# Patient Record
Sex: FEMALE
[De-identification: names, ages, dates, MRNs, and addresses within clinical notes are randomized per-mention and may not be internally consistent; named-entity substitution may affect disease eponyms.]

---

## 2023-03-23 ENCOUNTER — HOSPITAL ENCOUNTER (EMERGENCY)
Dept: HOSPITAL 97 - ER | Age: 38
Discharge: HOME | End: 2023-03-23
Payer: COMMERCIAL

## 2023-03-23 VITALS — SYSTOLIC BLOOD PRESSURE: 113 MMHG | DIASTOLIC BLOOD PRESSURE: 77 MMHG

## 2023-03-23 VITALS — OXYGEN SATURATION: 100 % | TEMPERATURE: 98 F

## 2023-03-23 DIAGNOSIS — Z88.5: ICD-10-CM

## 2023-03-23 DIAGNOSIS — R00.2: Primary | ICD-10-CM

## 2023-03-23 DIAGNOSIS — Z88.8: ICD-10-CM

## 2023-03-23 LAB
ALBUMIN SERPL BCP-MCNC: 3.8 G/DL (ref 3.4–5)
ALP SERPL-CCNC: 43 U/L (ref 45–117)
ALT SERPL W P-5'-P-CCNC: 33 U/L (ref 13–56)
AST SERPL W P-5'-P-CCNC: 14 U/L (ref 15–37)
BUN BLD-MCNC: 17 MG/DL (ref 7–18)
GLUCOSE SERPLBLD-MCNC: 88 MG/DL (ref 74–106)
HCT VFR BLD CALC: 33.9 % (ref 36–45)
LYMPHOCYTES # SPEC AUTO: 2.3 K/UL (ref 0.7–4.9)
MCV RBC: 94.6 FL (ref 80–100)
NT-PROBNP SERPL-MCNC: 48 PG/ML (ref ?–125)
PMV BLD: 8.6 FL (ref 7.6–11.3)
POTASSIUM SERPL-SCNC: 3.6 MEQ/L (ref 3.5–5.1)
RBC # BLD: 3.59 M/UL (ref 3.86–4.86)
TROPONIN I SERPL HS-MCNC: 3.3 PG/ML (ref ?–58.9)

## 2023-03-23 PROCEDURE — 85025 COMPLETE CBC W/AUTO DIFF WBC: CPT

## 2023-03-23 PROCEDURE — 81001 URINALYSIS AUTO W/SCOPE: CPT

## 2023-03-23 PROCEDURE — 84484 ASSAY OF TROPONIN QUANT: CPT

## 2023-03-23 PROCEDURE — 80076 HEPATIC FUNCTION PANEL: CPT

## 2023-03-23 PROCEDURE — 99284 EMERGENCY DEPT VISIT MOD MDM: CPT

## 2023-03-23 PROCEDURE — 80048 BASIC METABOLIC PNL TOTAL CA: CPT

## 2023-03-23 PROCEDURE — 71045 X-RAY EXAM CHEST 1 VIEW: CPT

## 2023-03-23 PROCEDURE — 96360 HYDRATION IV INFUSION INIT: CPT

## 2023-03-23 PROCEDURE — 36415 COLL VENOUS BLD VENIPUNCTURE: CPT

## 2023-03-23 PROCEDURE — 83880 ASSAY OF NATRIURETIC PEPTIDE: CPT

## 2023-03-23 PROCEDURE — 93005 ELECTROCARDIOGRAM TRACING: CPT

## 2023-03-23 NOTE — XMS REPORT
Continuity of Care Document

                            Created on:2023



Patient:LESLEY ESTRELLA

Sex:Female

:1985

External Reference #:[id number]





Demographics







                          Address                   [address]



                                                    [place] [address]

 

                          Home Phone                [phone]

 

                          Work Phone                [phone]

 

                          Mobile Phone              [phone]

 

                          Email Address             [email or web address]

[email or web address]

 

                          Preferred Language        en

 

                          Marital Status            Unknown

 

                          Confucianist Affiliation     Unknown

 

                          Race                      Unknown

 

                          Additional Race(s)        White



                                                    Unavailable

 

                          Ethnic Group               or 









Author







                          Organization              [facility]

t

 

                          Address                   [address]



                                                    [place] [address]

 

                          Phone                     [phone]









Support







                Name            Relationship    Address         Phone

 

                Yue Awan Mother          [address]    [phone]



                                                [place] [address] 

 

                Vasquez Parson Spouse          [address]      [phone]



                                                [place] [address] 

 

                Anshu Reza  Spouse          Unavailable     [phone]

 

                one else per patient, No Unavailable     Unavailable     Unavail

able

 

                PATIENT, NO ONE ELSE PER Unavailable     Unavailable     Unavail

able

 

                FAVIAN REZA Significant     [address] [phone]



                                                [place] [address] 

 

                Lesley Reza Unavailable     [address] [place] [address] [phone]

[address] [place] [id number] 









Care Team Providers







                    Name                Role                Phone

 

                    SCOTT LAGUNA     Primary Care Physician Unavailable

 

                    Scott Laguna     Attending Clinician Unavailable

 

                    JOE JOHN      Attending Clinician Unavailable

 

                    Joe John MD   Attending Clinician [phone]

 

                    Rachel Enrique MD     Attending Clinician [phone]

 

                    Unknown, Attending  Attending Clinician Unavailable

 

                    RACHEL ENRIQUE        Attending Clinician Unavailable

 

                    Doctor Unassigned, No Name Attending Clinician Unavailable

 

                    Only, Ang Db Test   Attending Clinician Unavailable

 

                    Joana Sorto Attending Clinician [phone]

 

                    JOANA HARRISON   Attending Clinician Unavailable

 

                    Rico Coates  Attending Clinician [phone]

 

                    RICO GEORGE      Attending Clinician Unavailable

 

                    JAN LAGUNA      Attending Clinician Unavailable

 

                    Jan Laguna MD   Attending Clinician [phone]

 

                    Only, Adc Ed Test   Attending Clinician Unavailable

 

                    Ysabel HIGGINS, Lin M      Attending Clinician Unavailable

 

                    Kieran Navarrete PA-C   Attending Clinician [phone]

 

                    KIERAN NAVARRETE        Attending Clinician Unavailable

 

                    Oneyda Catalan Attending Clinician [phone]

 

                    Provider, [facility] Attending Clinician Unavailable

 

                    Yvonne FNMauricio TURCIOS Attending Clinician [phone]

 

                    MAURICIO RUSSELL Attending Clinician Unavailable

 

                    Lab, Adc Fam Pob I  Attending Clinician Unavailable

 

                    ONEYDA DUNNE    Attending Clinician Unavailable

 

                    ULISES SCHILLING   Attending Clinician Unavailable

 

                    Amado Arreola DO Attending Clinician [phone]

 

                    Toño Gonzalez DO  Attending Clinician [phone]

 

                    Alfred FNP, Baylee  Attending Clinician [phone]

 

                    BAYLEE SIMON      Attending Clinician Unavailable

 

                    Nica Serrato Attending Clinician [phone]

 

                    Only, Dic Test      Attending Clinician Unavailable

 

                    Norris Singh MD Attending Clinician [phone]

 

                    NORRIS SINGH    Attending Clinician Unavailable

 

                    JOE JOHN      Admitting Clinician Unavailable

 

                    JAN LAGUNA      Admitting Clinician Unavailable









Payers







           Payer Name Policy Type Policy Number Effective Date Expiration Date S

ourrandall

 

           [facility]            [id number] 2018            



           EMPLOYEE PLAN                       00:00:00              

 

           [employer]         [id number] 2018            [facility]                       00:00:00              - [facility]







Problems







       Condition Condition Condition Status Onset  Resolution Last   Treating Co

mments 

Source



       Name   Details Category        Date   Date   Treatment Clinician        



                                                 Date                 

 

       Hepatic Hepatic Disease Active 2016                             Univers



       lesion lesion                                              ity of



                                   00:00:                             [address]

 

       Mesenteric Mesenteric Disease Active 2016                             U

nivers



       lymphadeno lymphadeno                                              it

y of



       nadia  nadia                00:00:                             [address]

 

       Insomnia Insomnia Disease Active                              Unive

rs



                                   9-                               ity of



                                   00:00:                             [address]

 

       Cervical Cervical Disease Active                              Unive

rs



       facet  facet                9-                               ity of



       syndrome syndrome               00:00:                             [address]

 

       [id number] Seasonal Problem Active                                    Comm

on



              allergic                                                  Spirit



              rhinitis,                                                  - [facility]



              unspecifie                                                  St



              d [facility]

 

       [id number] Hypothyroi Problem Active                                    Com

mon



              dism,                                                   Spirit



              unspecifie                                                  - [facility]



              d type                                                  [facility]

 

       [id number] ADHD   Problem Active                                    Common



              (attention                                                  Spirit



              deficit                                                  - [facility]



              hyperactiv                                                  [facility]                                                     [facility]



              disorder),                                                  Medica

l



              inattentiv                                                  Center



              e type                                                  

 

       [id number] Depression Problem Active                                    Co

mmon



              with                                                    Spirit



              anxiety                                                  - [facility]

 

       [id number] PTSD   Problem Active                                    Common



              (post-trau                                                  Spirit



              matic                                                   - [facility]



              stress                                                  St



              disorder)                                                  [facility]

 

       [id number] Hyperlipid Problem Active                                    Com

mon



              emia,                                                   Spirit



              unspecifie                                                  - [facility]



              d                                                       St



              hyperlipid                                                  [facility]



              emia [facility]

 

       [id number] Gastroesop Problem Active                                    Co

mmon



              hageal                                                  Spirit



              reflux                                                  - [facility]



              disease                                                  [facility]



              esophagiti                                                  Medica

l



              s                                                       [place]

 

       [id number] Tobacco Problem Active                                    Commo

n



              use                                                     [facility]



              disorder                                                  [facility]

 

       [id number] Primary Problem Active                                    Common



              insomnia                                                  [facility]

 

       [id number] Neck pain Problem Active                                    Comm

on



                                                                      Spirit



                                                                      [facility]

 

       [id number] Otalgia of Problem Active                                    Co

mmon



              both ears                                                  [facility]

 

       [id number] Essential Problem Active                                    Com

mon



              tremor                                                  [facility]

 

       [id number] Adult BMI Problem Active                                    Com

mon



              40.0-44.9                                                  Spirit



              kg/sq [facility]







Allergies, Adverse Reactions, Alerts







       Allergy Allergy Status Severity Reaction(s) Onset  Inactive Treating Comm

ents 

Source



       Name   Type                        Date   Date   Clinician        

 

       MORPHINE DRUG   Active        Hallucinates 2020                      Un

florian



              INGREDI                      2-11                        ity of



                                          00:00:                      [place]



                                                                    [facility]

 

       Morphine Propensi Active        Hallucinatio 2020                      

Univers



              ty to                ns     2-11                        ity of



              adverse                      00:00:                      [place]



              reaction                                                [facility]

 

       PSEUDOEP DRUG   Active        SOB                          Univers



       HEDRINE INGREDI                      9-20                        ity of



       HCL                                00::                      [place]



                                          00                          [facility]

 

       TRAMADOL DRUG   Active        Anxiety                       Univers



              INGREDI                      9-20                        ity of



                                          00:00:                      [place]



                                          00                          [facility]

 

       Pseudoep Propensi Active        Shortness of                       

Univers



       hedrine ty to                Breath 9-20                        ity of



       Hcl    adverse                      00:00:                      [place]



              reaction                                                [facility]

 

       Tramadol Propensi Active        Anxiety 2016-0                      Unive

rs



              ty to                       9-20                        ity of



              adverse                      00:00:                      [place]



              reaction                      00                          Medical



              s                                                       Branch

 

       7826   Drug   Active        makes heart                             Commo

n



              allergy               race                               [facility]

 

       topirama topirama Active        mouth break                             C

ommon



       te     te                   out                                [facility]

 

       tramadol tramadol Active        more                               Common



                                   anxious/no                             Spirit



                                   sleep                              [facility]







Social History







           Social Habit Start Date Stop Date  Quantity   Comments   Source

 

           History of Tobacco                       Current Smoker            Co

mmon Spirit -



           Use                                                    [facility]

 

           Sex Assigned At                                             Common Sp

lila -



           Birth                                                  [facility]

 

           Exposure to 2023 Not sure              [facility]



           SARS-CoV-2 (event) 00:00:00   23:13:00                         [facility]

 

           Alcohol intake 2022 0 /d                  University

 of



                      00:00:00   00:00:00                         [facility]

 

           Cigarettes smoked 2021                       Univers

ity of



           current (pack per 00:00:00   00:00:00                         [place] M

edical



           day) - Reported                                             [place]

 

           Tobacco use and 2021 Smokeless             Universit

y of



           exposure   00:00:00   00:00:00   tobacco non-user            [place] [facility]









                Smoking Status  Start Date      Stop Date       Source

 

                Current Smoker  2021 00:00:00                 [facility] Spiri

t [facility]







Medications







       Ordered Filled Start  Stop   Current Ordering Indication Dosage Frequency

 Signature

                    Comments            Components          Source



     Medication Medication Date Date Medication? Clinician                (SIG) 

          



     Name Name                                                   

 

     ketorolac      2023-              30mg      30 mg,           Unive

rs



     (TORADOL)                                Slow IV           ity of



     injection      07:00: 06:03                          Push,           [place]



     30 mg      00   :00                           ONCE, 1           Medical



                                                  dose, On           Branch



                                                  23 at           



                                                  0100,           



                                                  Routine           

 

     dextroamphe      2022      Yes            10mg      Take 10 mg           

Univers



     tamine-amph                                     by mouth           ity 

of



     etamine 10      11:08:                               every           [place]



     mg tablet      29                                 morning.           Medica

l



                                                                 Branch

 

     escitalopra      2022      Yes            20mg      Take 20 mg           

Univers



     m oxalate                                     by mouth           ity of



     20 mg      11:08:                               daily.           [place]



     tablet      29                                                Medical



                                                                 Branch

 

     propranoloL      2022      Yes            20mg      Take 20 mg           

Univers



     20 mg      1-22                               by mouth 2           ity of



     tablet      11:08:                               (two)           [place]



               29                                 times           Medical



                                                  daily.           Branch

 

     dextroamphe      2022      Yes            10mg      Take 10 mg           

Univers



     tamine-amph      1-22                               by mouth           ity 

of



     etamine 10      11:08:                               every           [place]



     mg tablet      29                                 morning.           Medica

l



                                                                 Branch

 

     escitalopra      2022      Yes            20mg      Take 20 mg           

Univers



     m oxalate      1-22                               by mouth           ity of



     20 mg      11:08:                               daily.           [place]



     tablet      29                                                Medical



                                                                 Branch

 

     propranoloL      2022      Yes            20mg      Take 20 mg           

Univers



     20 mg      1-22                               by mouth 2           ity of



     tablet      11:08:                               (two)           [place]



               29                                 times           Medical



                                                  daily.           Branch

 

     dextroamphe      2022      Yes            10mg      Take 10 mg           

Univers



     tamine-amph      1-22                               by mouth           ity 

of



     etamine 10      11:08:                               every           [place]



     mg tablet      29                                 morning.           Medica

l



                                                                 Branch

 

     escitalopra      2022      Yes            20mg      Take 20 mg           

Univers



     m oxalate      1-22                               by mouth           ity of



     20 mg      11:08:                               daily.           [place]



     tablet      29                                                Medical



                                                                 Branch

 

     propranoloL      2022      Yes            20mg      Take 20 mg           

Univers



     20 mg      1-22                               by mouth 2           ity of



     tablet      11:08:                               (two)           [place]



               29                                 times           Medical



                                                  daily.           Branch

 

     dextroamphe      2022      Yes            10mg      Take 10 mg           

Univers



     tamine-amph      1-22                               by mouth           ity 

of



     etamine 10      11:08:                               every           [place]



     mg tablet      29                                 morning.           Medica

l



                                                                 Branch

 

     escitalopra      2022      Yes            20mg      Take 20 mg           

Univers



     m oxalate      1-22                               by mouth           ity of



     20 mg      11:08:                               daily.           [place]



     tablet      29                                                Medical



                                                                 Branch

 

     propranoloL      2022      Yes            20mg      Take 20 mg           

Univers



     20 mg      1-22                               by mouth 2           ity of



     tablet      11:08:                               (two)           [place]



               29                                 times           Medical



                                                  daily.           Branch

 

     ondansetron      2022      Yes       [id number] 4mg       Take 1           U

nivers



     4 mg      1-22                               tablet by           ity of



     disintegrat      00:00:                               mouth           [place]



     ing tablet      00                                 every 8           Medica

l



                                                  (eight)           Branch



                                                  hours as           



                                                  needed for           



                                                  Nausea and           



                                                  Vomiting           



                                                  (N/V).           

 

     benzonatate      2022      Yes       [id number] 200mg      Take 2           

Univers



     100 mg      1-22                               capsules           ity of



     capsule      00:00:                               by mouth           [place]



               00                                 every 8           Medical



                                                  (eight)           Branch



                                                  hours as           



                                                  needed for           



                                                  Cough.           

 

     ondansetron      2022      Yes       [id number] 4mg       Take 1           U

nivers



     4 mg      1-22                               tablet by           ity of



     disintegrat      00:00:                               mouth           [place]



     ing tablet      00                                 every 8           Medica

l



                                                  (eight)           Branch



                                                  hours as           



                                                  needed for           



                                                  Nausea and           



                                                  Vomiting           



                                                  (N/V).           

 

     benzonatate      2022      Yes       [id number] 200mg      Take 2           

Univers



     100 mg      1-22                               capsules           ity of



     capsule      00:00:                               by mouth           [place]



               00                                 every 8           [facility]



                                                  (eight)           [facility]



                                                  hours as           



                                                  needed for           



                                                  Cough.           

 

     ondansetron      2022      Yes       [id number] 4mg       Take 1           [facility]

[facility]



     4 mg      1-22                               tablet by           [facility] of



     disintegrat      00:00:                               mouth           [place]



     ing tablet      00                                 every 8           [facility]

l



                                                  (eight)           [facility]



                                                  hours as           



                                                  needed for           



                                                  Nausea and           



                                                  Vomiting           



                                                  (N/V).           

 

     benzonatate      2022      Yes       [id number] 200mg      Take 2           

[facility]



     100 mg      1-22                               capsules           [facility]



     capsule      00:00:                               by mouth           [place]



               00                                 every 8           [facility]



                                                  (eight)           [facility]



                                                  hours as           



                                                  needed for           



                                                  Cough.           

 

     ondansetron      2022      Yes       [id number] 4mg       Take 1           [facility]

n[facility]



     4 mg      1-22                               tablet by           [facility] of



     disintegrat      00:00:                               mouth           [place]



     ing tablet      00                                 every 8           [facility]

l



                                                  (eight)           [facility]



                                                  hours as           



                                                  needed for           



                                                  Nausea and           



                                                  Vomiting           



                                                  (N/V).           

 

     benzonatate      2022      Yes       [id number] 200mg      Take 2           

[facility]



     100 mg      1-22                               capsules           [facility]



     capsule      00:00:                               by mouth           [place]



               00                                 every 8           [facility]



                                                  (eight)           [facility]



                                                  hours as           



                                                  needed for           



                                                  Cough.           

 

     oseltamivir      2022- No        [id number] 75mg      Take 1           

[facility]



     75 mg      1-22 11-28                          capsule by           [facility]



     capsule      00:00: 05:59                          mouth in           [place]



               00   :00                           the            Medical



                                                  morning           [facility]



                                                  and 1           



                                                  capsule in           



                                                  the            



                                                  evening.           



                                                  Do all           



                                                  this for 5           



                                                  days.           

 

     Amphetamine Amphetamine 2021      No             1{table BID  Amphetamin 

          



     -Dextroamph -Dextroamph 0-09                     t}        e-Dextroam      

     



     etamine 10 etamine 10 00:00:                               phetamine       

    



     MG   MG   00                                 10 MG           

 

     Adderall 10 Adderall 10 0      No             1{table BID  Adderall   

        



     MG   MG   9-08                     t}        10 MG           



               00:00:                                              



               00                                                

 

     Polymyxin Polymyxin  202- No             1{drop_ QID  Polymyxin     

      



     B-Trimethop B-Trimethop 15                into_af      B-Trimetho  

         



     rim  rim  00:00: 00:00                fected_      prim           



     98189-5.1 89458-6.1 00   :00                 eye}      07342-4.1           



     UNIT/ML UNIT/ML                                    UNIT/ML           

 

     Amphetamine Amphetamine 0      No             1{table BID  Amphetamin 

          



     -Dextroamph -Dextroamph 7-12                     t}        e-Dextroam      

     



     etamine 10 etamine 10 00:00:                               phetamine       

    



     MG   MG   00                                 10 MG           

 

     Amphetamine Amphetamine -0      No             1{table BID  Amphetamin 

          



     -Dextroamph -Dextroamph 7-12                     t}        e-Dextroam      

     



     etamine 10 etamine 10 00:00:                               phetamine       

    



     MG   MG   00                                 10 MG           

 

     Amphetamine Amphetamine -0      No             1{table BID  Amphetamin 

          



     -Dextroamph -Dextroamph 7-12                     t}        e-Dextroam      

     



     etamine 10 etamine 10 00:00:                               phetamine       

    



     MG   MG   00                                 10 MG           

 

     Amphetamine Amphetamine -0      No             1{table BID  Amphetamin 

          



     -Dextroamph -Dextroamph 7-12                     t}        e-Dextroam      

     



     etamine 10 etamine 10 00:00:                               phetamine       

    



     MG   MG   00                                 10 MG           

 

     Adderall 10 Adderall 10 -0      No             1{table BID  Adderall   

        



     MG   MG   6-08                     t}        10 MG           



               00:00:                                              



               00                                                

 

     Adderall 10 Adderall 10 -0      No             1{table BID  Adderall   

        



     MG   MG   6-08                     t}        10 MG           



               00:00:                                              



               00                                                

 

     Adderall 10 Adderall 10 -0      No             1{table BID  Adderall   

        



     MG   MG   6-08                     t}        10 MG           



               00:00:                                              



               00                                                

 

     Adderall 10 Adderall 10 -0      No             1{table BID  Adderall   

        



     MG   MG   6-08                     t}        10 MG           



               00:00:                                              



               00                                                

 

     benzonatate      -0      Yes       [id number] 100mg      Take 1          

 [facility]



     (TESSALON      4-07                               capsule by           [facility] 

of



     PERLES) 100      00:00:                               mouth 3           [facility]

as



     mg capsule      00                                 (three)           [facility]

l



                                                  times           [facility]



                                                  daily.           

 

     azelastine            Yes       [id number] 1{spray      Use 1         

  [facility]



     137 mcg      4-07                     }         Spray in           [facility]



     (0.1 %)      00:00:                               each           [place]



     nasal spray      00                                 nostril 2           [facility]

ical



                                                  (two)           [facility]



                                                  times           



                                                  daily. Use           



                                                  in each           



                                                  nostril as           



                                                  directed           

 

     benzonatate            Yes       [id number] 100mg      Take 1          

 [facility]



     (TESSALON      4-07                               capsule by           [facility] 

of



     PERLES) 100      00:00:                               mouth 3           [facility]

as



     mg capsule      00                                 (three)           [facility]

l



                                                  times           [facility]



                                                  daily.           

 

     azelastine            Yes       [id number] 1{spray      Use 1         

  [facility]



     137 mcg      4-07                     }         Spray in           [facility]



     (0.1 %)      00:00:                               each           [place]



     nasal spray      00                                 nostril 2           [facility]

ical



                                                  (two)           [facility]



                                                  times           



                                                  daily. Use           



                                                  in each           



                                                  nostril as           



                                                  directed           

 

     benzonatate            Yes       [id number] 100mg      Take 1          

 [facility]



     (TESSALON      4-07                               capsule by           [facility] 

of



     PERLES) 100      00:00:                               mouth 3           [facility]

as



     mg capsule      00                                 (three)           [facility]

l



                                                  times           [facility]



                                                  daily.           

 

     azelastine            Yes       [id number] 1{spray      Use 1         

  [facility]



     137 mcg      4-07                     }         Spray in           [facility]



     (0.1 %)      00:00:                               each           [place]



     nasal spray      00                                 nostril 2           [facility]

ical



                                                  (two)           [facility]



                                                  times           



                                                  daily. Use           



                                                  in each           



                                                  nostril as           



                                                  directed           

 

     benzonatate            Yes       [id number] 100mg      Take 1          

 [facility]



     (TESSALON      4-07                               capsule by           [facility] 

of



     PERLES) 100      00:00:                               mouth 3           [facility]

as



     mg capsule      00                                 (three)           [facility]

l



                                                  times           [facility]



                                                  daily.           

 

     azelastine            Yes       [id number] 1{spray      Use 1         

  [facility]



     137 mcg      4-07                     }         Spray in           [facility]



     (0.1 %)      00:00:                               each           [place]



     nasal spray      00                                 nostril 2           Med

ical



                                                  (two)           [facility]



                                                  times           



                                                  daily. Use           



                                                  in each           



                                                  nostril as           



                                                  directed           

 

     benzonatate            Yes       [id number] 100mg      Take 1          

 [facility]



     (TESSALON      4-07                               capsule by           [facility] 

of



     PERLES) 100      00:00:                               mouth 3           [facility]

as



     mg capsule      00                                 (three)           [facility]

l



                                                  times           [facility]



                                                  daily.           

 

     azelastine            Yes       [id number] 1{spray      Use 1         

  [facility]



     137 mcg      4-07                     }         Spray in           [facility] of



     (0.1 %)      00:00:                               each           [place]



     nasal spray      00                                 nostril 2           Med

ical



                                                  (two)           [facility]



                                                  times           



                                                  daily. Use           



                                                  in each           



                                                  nostril as           



                                                  directed           

 

     benzonatate      0      Yes       [id number] 100mg      Take 1          

 [facility]



     (TESSALON      4-07                               capsule by           [facility] 

of



     PERLES) 100      00:00:                               mouth 3           [facility]

as



     mg capsule      00                                 (three)           [facility]

l



                                                  times           [facility]



                                                  daily.           

 

     azelastine            Yes       [id number] 1{spray      Use 1         

  [facility]



     137 mcg      4-07                     }         Spray in           [facility]



     (0.1 %)      00:00:                               each           [place]



     nasal spray      00                                 nostril 2           [facility]

ical



                                                  (two)           [facility]



                                                  times           



                                                  daily. Use           



                                                  in each           



                                                  nostril as           



                                                  directed           

 

     benzonatate      0      Yes       [id number] 100mg      Take 1          

 [facility]



     (TESSALON      4-07                               capsule by           [facility] 

of



     PERLES) 100      00:00:                               mouth 3           [facility]

as



     mg capsule      00                                 (three)           [facility]

l



                                                  times           [facility]



                                                  daily.           

 

     azelastine            Yes       [id number] 1{spray      Use 1         

  [facility]



     137 mcg      4-07                     }         Spray in           [facility]



     (0.1 %)      00:00:                               each           [place]



     nasal spray      00                                 nostril 2           [facility]

ical



                                                  (two)           [facility]



                                                  times           



                                                  daily. Use           



                                                  in each           



                                                  nostril as           



                                                  directed           

 

     benzonatate      0      Yes       [id number] 100mg      Take 1          

 [facility]



     (TESSALON      4-07                               capsule by           [facility] 

of



     PERLES) 100      00:00:                               mouth 3           [facility]

as



     mg capsule      00                                 (three)           [facility]

l



                                                  times           [facility]



                                                  daily.           

 

     azelastine            Yes       [id number] 1{spray      Use 1         

  [facility]



     137 mcg      4-07                     }         Spray in           [facility] of



     (0.1 %)      00:00:                               each           [place]



     nasal spray      00                                 nostril 2           Med

ical



                                                  (two)           [facility]



                                                  times           



                                                  daily. Use           



                                                  in each           



                                                  nostril as           



                                                  directed           

 

     benzonatate      0      Yes       [id number] 100mg      Take 1          

 [facility]



     (TESSALON      4-07                               capsule by           [facility] 

of



     PERLES) 100      00:00:                               mouth 3           [facility]

as



     mg capsule      00                                 (three)           [facility]

l



                                                  times           [facility]



                                                  daily.           

 

     azelastine      0      Yes       [id number] 1{spray      Use 1         

  [facility]



     137 mcg      4-07                     }         Spray in           [facility] of



     (0.1 %)      00:00:                               each           [place]



     nasal spray      00                                 nostril 2           Med

ical



                                                  (two)           [facility]



                                                  times           



                                                  daily. Use           



                                                  in each           



                                                  nostril as           



                                                  directed           

 

     benzonatate      -0      Yes       [id number] 100mg      Take 1          

 [facility]



     (TESSALON      4-07                               capsule by           [facility] 

of



     PERLES) 100      00:00:                               mouth 3           [facility]

as



     mg capsule      00                                 (three)           [facility]

l



                                                  times           [facility]



                                                  daily.           

 

     azelastine            Yes       [id number] 1{spray      Use 1         

  [facility]



     137 mcg      4-07                     }         Spray in           [facility] of



     (0.1 %)      00:00:                               each           [place]



     nasal spray      00                                 nostril 2           Med

ical



                                                  (two)           [facility]



                                                  times           



                                                  daily. Use           



                                                  in each           



                                                  nostril as           



                                                  directed           

 

     benzonatate      0      Yes       [id number] 100mg      Take 1          

 [facility]



     (TESSALON      4-07                               capsule by           [facility] 

of



     PERLES) 100      00:00:                               mouth 3           [facility]

as



     mg capsule      00                                 (three)           [facility]

l



                                                  times           [facility]



                                                  daily.           

 

     azelastine            Yes       [id number] 1{spray      Use 1         

  [facility]



     137 mcg      4-07                     }         Spray in           [facility] of



     (0.1 %)      00:00:                               each           [place]



     nasal spray      00                                 nostril 2           [facility]

ical



                                                  (two)           [facility]



                                                  times           



                                                  daily. Use           



                                                  in each           



                                                  nostril as           



                                                  directed           

 

     benzonatate      0      Yes       [id number] 100mg      Take 1          

 [facility]



     (TESSALON      4-07                               capsule by           [facility] 

of



     PERLES) 100      00:00:                               mouth 3           [facility]

as



     mg capsule      00                                 (three)           [facility]

l



                                                  times           [facility]



                                                  daily.           

 

     azelastine            Yes       [id number] 1{spray      Use 1         

  [facility]



     137 mcg      4-07                     }         Spray in           [facility] of



     (0.1 %)      00:00:                               each           [place]



     nasal spray      00                                 nostril 2           [facility]

ical



                                                  (two)           [facility]



                                                  times           



                                                  daily. Use           



                                                  in each           



                                                  nostril as           



                                                  directed           

 

     benzonatate      -0      Yes       [id number] 100mg      Take 1          

 [facility]



     (TESSALON      4-07                               capsule by           [facility] 

of



     PERLES) 100      00:00:                               mouth 3           [facility]

as



     mg capsule      00                                 (three)           [facility]

l



                                                  times           [facility]



                                                  daily.           

 

     azelastine      0      Yes       [id number] 1{spray      Use 1         

  [facility]



     137 mcg      4-07                     }         Spray in           [facility] of



     (0.1 %)      00:00:                               each           [place]



     nasal spray      00                                 nostril 2           [facility]

ical



                                                  (two)           [facility]



                                                  times           



                                                  daily. Use           



                                                  in each           



                                                  nostril as           



                                                  directed           

 

     benzonatate      -0      Yes       [id number] 100mg      Take 1          

 [facility]



     (TESSALON      4-07                               capsule by           ity 

of



     PERLES) 100      00:00:                               mouth 3           [facility]

as



     mg capsule      00                                 (three)           [facility]

l



                                                  times           [facility]



                                                  daily.           

 

     azelastine      -0      Yes       [id number] 1{spray      Use 1         

  [facility]



     137 mcg      4-07                     }         Spray in           [facility]



     (0.1 %)      00:00:                               each           [place]



     nasal spray      00                                 nostril 2           [facility]

ical



                                                  (two)           [facility]



                                                  times           



                                                  daily. Use           



                                                  in each           



                                                  nostril as           



                                                  directed           

 

     benzonatate            Yes       [id number] 100mg      Take 1          

 [facility]



     (TESSALON      4-07                               capsule by           [facility] 

of



     PERLES) 100      00:00:                               mouth 3           [facility]

as



     mg capsule      00                                 (three)           [facility]

l



                                                  times           [facility]



                                                  daily.           

 

     azelastine            Yes       [id number] 1{spray      Use 1         

  [facility]



     137 mcg      4-07                     }         Spray in           [facility]



     (0.1 %)      00:00:                               each           [place]



     nasal spray      00                                 nostril 2           [facility]

ical



                                                  (two)           [facility]



                                                  times           



                                                  daily. Use           



                                                  in each           



                                                  nostril as           



                                                  directed           

 

     benzonatate            Yes       [id number] 100mg      Take 1          

 [facility]



     (TESSALON      4-07                               capsule by           [facility] 

of



     PERLES) 100      00:00:                               mouth 3           [facility]

[facility]



     mg capsule      00                                 (three)           [facility]

l



                                                  times           [facility]



                                                  daily.           

 

     azelastine            Yes       [id number] 1{spray      Use 1         

  [facility]



     137 mcg      4-07                     }         Spray in           [facility]



     (0.1 %)      00:00:                               each           [place]



     nasal spray      00                                 nostril 2           Med

ical



                                                  (two)           [facility]



                                                  times           



                                                  daily. Use           



                                                  in each           



                                                  nostril as           



                                                  directed           

 

     dextroamphe            Yes            10mg      Take 10 mg           

[facility]



     tamine-amph      2-09                               by mouth           [facility] 

of



     etamine      14:41:                               every           [place]



     (ADDERALL)      15                                 morning.           [facility]

al



     10 mg                                                        [facility]



     tablet                                                        

 

     escitalopra            Yes            20mg      Take 20 mg           

[facility]



     m oxalate      2-09                               by mouth           [facility]



     (LEXAPRO)      14:41:                               daily.           [place]



     20 mg      15                                                [facility]



     tablet                                                        [facility]

 

     propranoloL      -0      Yes            20mg      Take 20 mg           

[facility]



     20 mg      2-09                               by mouth 2           [facility]



     tablet      14:41:                               (two)           [place]



               15                                 times           [facility]



                                                  daily.           [facility]

 

     dextroamphe            Yes            10mg      Take 10 mg           

[facility]



     tamine-amph      2-09                               by mouth           [facility] 

of



     etamine      14:41:                               every           [place]



     (ADDERALL)      15                                 morning.           [facility]

al



     10 mg                                                        [facility]



     tablet                                                        

 

     escitalopra            Yes            20mg      Take 20 mg           

[facility]



     m oxalate      2-09                               by mouth           [facility]



     (LEXAPRO)      14:41:                               daily.           [place]



     20 mg      15                                                [facility]



     tablet                                                        [facility]

 

     propranoloL      1-0      Yes            20mg      Take 20 mg           

[facility]



     20 mg      2-09                               by mouth 2           [facility]



     tablet      14:41:                               (two)           [place]



               15                                 times           [facility]



                                                  daily.           [facility]

 

     dextroamphe      2021-0      Yes            10mg      Take 10 mg           

[facility]



     tamine-amph      2-09                               by mouth           [facility] 

of



     etamine      14:41:                               every           [place]



     (ADDERALL)      15                                 morning.           [facility]

al



     10 mg                                                        [facility]



     tablet                                                        

 

     escitalopra      1-0      Yes            20mg      Take 20 mg           

[facility]



     m oxalate      2-09                               by mouth           [facility]



     (LEXAPRO)      14:41:                               daily.           [place]



     20 mg      15                                                [facility]



     tablet                                                        [facility]

 

     propranoloL      1-0      Yes            20mg      Take 20 mg           

[facility]



     20 mg      2-09                               by mouth 2           [facility]



     tablet      14:41:                               (two)           [place]



               15                                 times           [facility]



                                                  daily.           [facility]

 

     dextroamphe      1-0      Yes            10mg      Take 10 mg           

[facility]



     tamine-amph      2-09                               by mouth           [facility] 

of



     etamine      14:41:                               every           [place]



     (ADDERALL)      15                                 morning.           [facility]

al



     10 mg                                                        [facility]



     tablet                                                        

 

     escitalopra      1-0      Yes            20mg      Take 20 mg           

[facility]



     m oxalate      2-09                               by mouth           [facility]



     (LEXAPRO)      14:41:                               daily.           [place]



     20 mg      15                                                [facility]



     tablet                                                        [facility]

 

     propranoloL      1-0      Yes            20mg      Take 20 mg           

[facility]



     20 mg      2-09                               by mouth 2           [facility]



     tablet      14:41:                               (two)           [place]



               15                                 times           [facility]



                                                  daily.           [facility]

 

     dextroamphe      1-0      Yes            10mg      Take 10 mg           

[facility]



     tamine-amph      2-09                               by mouth           [facility] 

of



     etamine      14:41:                               every           [place]



     (ADDERALL)      15                                 morning.           [facility]

al



     10 mg                                                        [facility]



     tablet                                                        

 

     escitalopra      1-0      Yes            20mg      Take 20 mg           

[facility]



     m oxalate      2-09                               by mouth           [facility]



     (LEXAPRO)      14:41:                               daily.           [place]



     20 mg      15                                                [facility]



     tablet                                                        [facility]

 

     propranoloL      1-0      Yes            20mg      Take 20 mg           

[facility]



     20 mg      2-09                               by mouth 2           [facility]



     tablet      14:41:                               (two)           [place]



               15                                 times           [facility]



                                                  daily.           [facility]

 

     dextroamphe      2021-0      Yes            10mg      Take 10 mg           

[facility]



     tamine-amph      2-09                               by mouth           [facility] 

of



     etamine      08:41:                               every           [place]



     (ADDERALL)      15                                 morning.           [facility]

al



     10 mg                                                        [facility]



     tablet                                                        

 

     escitalopra      2021-0      Yes            20mg      Take 20 mg           

[facility]



     m oxalate      2-09                               by mouth           [facility]



     (LEXAPRO)      08:41:                               daily.           [place]



     20 mg      15                                                [facility]



     tablet                                                        [facility]

 

     propranoloL      1-0      Yes            20mg      Take 20 mg           

[facility]



     20 mg      2-09                               by mouth 2           [facility]



     tablet      08:41:                               (two)           [place]



               15                                 times           [facility]



                                                  daily.           [facility]

 

     dextroamphe      1-0      Yes            10mg      Take 10 mg           

[facility]



     tamine-amph      2-09                               by mouth           [facility] 

of



     etamine      08:41:                               every           [place]



     (ADDERALL)      15                                 morning.           [facility]

al



     10 mg                                                        [facility]



     tablet                                                        

 

     escitalopra      1-0      Yes            20mg      Take 20 mg           

[facility]



     m oxalate      2-09                               by mouth           [facility]



     (LEXAPRO)      08:41:                               daily.           [place]



     20 mg      15                                                [facility]



     tablet                                                        [facility]

 

     propranoloL      1-0      Yes            20mg      Take 20 mg           

[facility]



     20 mg      2-09                               by mouth 2           [facility]



     tablet      08:41:                               (two)           [place]



               15                                 times           [facility]



                                                  daily.           [facility]

 

     dextroamphe      1-0      Yes            10mg      Take 10 mg           

[facility]



     tamine-amph      2-09                               by mouth           [facility] 

of



     etamine      08:41:                               every           [place]



     (ADDERALL)      15                                 morning.           [facility]

al



     10 mg                                                        [facility]



     tablet                                                        

 

     escitalopra      1-0      Yes            20mg      Take 20 mg           

[facility]



     m oxalate      2-09                               by mouth           [facility]



     (LEXAPRO)      08:41:                               daily.           [place]



     20 mg      15                                                [facility]



     tablet                                                        [facility]

 

     propranoloL      1-0      Yes            20mg      Take 20 mg           

[facility]



     20 mg      2-09                               by mouth 2           [facility]



     tablet      08:41:                               (two)           [place]



               15                                 times           [facility]



                                                  daily.           [facility]

 

     dextroamphe      1-0      Yes            10mg      Take 10 mg           

[facility]



     tamine-amph      2-09                               by mouth           [facility] 

of



     etamine      08:41:                               every           [place]



     (ADDERALL)      15                                 morning.           [facility]

al



     10 mg                                                        [facility]



     tablet                                                        

 

     escitalopra      1-0      Yes            20mg      Take 20 mg           

[facility]



     m oxalate      2-09                               by mouth           [facility]



     (LEXAPRO)      08:41:                               daily.           [place]



     20 mg      15                                                [facility]



     tablet                                                        [facility]

 

     propranoloL      1-0      Yes            20mg      Take 20 mg           

[facility]



     20 mg      2-09                               by mouth 2           [facility]



     tablet      08:41:                               (two)           [place]



               15                                 times           [facility]



                                                  daily.           [facility]

 

     dextroamphe      2021-0      Yes            10mg      Take 10 mg           

[facility]



     tamine-amph      2-09                               by mouth           [facility] 

of



     etamine      08:41:                               every           [place]



     (ADDERALL)      15                                 morning.           [facility]

al



     10 mg                                                        [facility]



     tablet                                                        

 

     escitalopra      2021-0      Yes            20mg      Take 20 mg           

[facility]



     m oxalate      2-09                               by mouth           [facility]



     (LEXAPRO)      08:41:                               daily.           [place]



     20 mg      15                                                [facility]



     tablet                                                        [facility]

 

     propranoloL      1-0      Yes            20mg      Take 20 mg           

[facility]



     20 mg      2-09                               by mouth 2           [facility]



     tablet      08:41:                               (two)           [place]



               15                                 times           [facility]



                                                  daily.           [facility]

 

     dextroamphe      2021-0      Yes            10mg      Take 10 mg           

[facility]



     tamine-amph      2-09                               by mouth           [facility] 

[facility]



     etamine      08:41:                               every           [place]



     (ADDERALL)      15                                 morning.           [facility]

al



     10 mg                                                        [facility]



     tablet                                                        

 

     escitalopra      1-0      Yes            20mg      Take 20 mg           

[facility]



     m oxalate      2-09                               by mouth           [facility]



     (LEXAPRO)      08:41:                               daily.           [place]



     20 mg      15                                                [facility]



     tablet                                                        [facility]

 

     propranoloL      1-0      Yes            20mg      Take 20 mg           

[facility]



     20 mg      2-09                               by mouth 2           [facility]



     tablet      08:41:                               (two)           [place]



               15                                 times           [facility]



                                                  daily.           [facility]

 

     dextroamphe      1-0      Yes            10mg      Take 10 mg           

[facility]



     tamine-amph      2-09                               by mouth           [facility] 

[facility]



     etamine      08:41:                               every           [place]



     (ADDERALL)      15                                 morning.           [facility]

al



     10 mg                                                        [facility]



     tablet                                                        

 

     escitalopra      1-0      Yes            20mg      Take 20 mg           

[facility]



     m oxalate      2-09                               by mouth           [facility]



     (LEXAPRO)      08:41:                               daily.           [place]



     20 mg      15                                                [facility]



     tablet                                                        [facility]

 

     propranoloL      1-0      Yes            20mg      Take 20 mg           

[facility]



     20 mg      2-09                               by mouth 2           [facility]



     tablet      08:41:                               (two)           [place]



               15                                 times           [facility]



                                                  daily.           [facility]

 

     dextroamphe      1-0      Yes            10mg      Take 10 mg           

[facility]



     tamine-amph      2-09                               by mouth           [facility] 

[facility]



     etamine      08:41:                               every           [place]



     (ADDERALL)      15                                 morning.           [facility]

al



     10 mg                                                        [facility]



     tablet                                                        

 

     escitalopra      2021-0      Yes            20mg      Take 20 mg           

[facility]



     m oxalate      2-09                               by mouth           [facility]



     (LEXAPRO)      08:41:                               daily.           [place]



     20 mg      15                                                [facility]



     tablet                                                        [facility]

 

     propranoloL      2021-0      Yes            20mg      Take 20 mg           

[facility]



     20 mg      2-09                               by mouth 2           [facility]



     tablet      08:41:                               (two)           [place]



               15                                 times           [facility]



                                                  daily.           [facility]

 

     dextroamphe      2021-0      Yes            10mg      Take 10 mg           

[facility]



     tamine-amph      2-09                               by mouth           [facility] 

of



     etamine      08:41:                               every           [place]



     (ADDERALL)      15                                 morning.           [facility]

al



     10 mg                                                        [facility]



     tablet                                                        

 

     escitalopra      0      Yes            20mg      Take 20 mg           

[facility]



     m oxalate      2-09                               by mouth           [facility]



     (LEXAPRO)      08:41:                               daily.           [place]



     20 mg      15                                                [facility]



     tablet                                                        [facility]

 

     propranoloL      0      Yes            20mg      Take 20 mg           

[facility]



     20 mg      2-09                               by mouth 2           [facility]



     tablet      08:41:                               (two)           [place]



               15                                 times           [facility]



                                                  daily.           [facility]

 

     ketorolac      0      Yes       [id number] 10mg      Take 1           [facility]

[facility]



     10 mg      2-08                               tablet by           [facility]



     tablet      00:00:                               mouth           [place]



               00                                 every 6           [facility]



                                                  (six)           [facility]



                                                  hours as           



                                                  needed for           



                                                  Pain           



                                                  (scale           



                                                  7-10).           

 

     ondansetron      0      Yes       [id number] 4mg       Take 1           [facility]

[facility]



     4 mg      2-08                               tablet by           [facility]



     disintegrat      00:00:                               mouth           [place]



     ing tablet      00                                 every 8           [facility]

l



                                                  (eight)           [facility]



                                                  hours as           



                                                  needed for           



                                                  Nausea and           



                                                  Vomiting           



                                                  (N/V).           

 

     tamsulosin      0      Yes       [id number] .4mg      Take 1           [facility]

[facility]



     0.4 mg 24      2-08                               capsule by           [facility] 

of



     hr capsule      00:00:                               mouth at           [facility]

as



               00                                 bedtime.           [facility]



                                                                 [facility]

 

     ketorolac      0      Yes       [id number] 10mg      Take 1           [facility]

[facility]



     10 mg      2-08                               tablet by           [facility]



     tablet      00:00:                               mouth           [place]



               00                                 every 6           [facility]



                                                  (six)           [facility]



                                                  hours as           



                                                  needed for           



                                                  Pain           



                                                  (scale           



                                                  7-10).           

 

     ondansetron      0      Yes       [id number] 4mg       Take 1           [facility]

[facility]



     4 mg      2-08                               tablet by           [facility]



     disintegrat      00:00:                               mouth           [place]



     ing tablet      00                                 every 8           [facility]

[facility]



                                                  (eight)           [facility]



                                                  hours as           



                                                  needed for           



                                                  Nausea and           



                                                  Vomiting           



                                                  (N/V).           

 

     tamsulosin      -0      Yes       [id number] .4mg      Take 1           [facility]

[facility]



     0.4 mg 24      2-08                               capsule by           [facility] 

of



     hr capsule      00:00:                               mouth at           [facility]

as



               00                                 bedtime.           [facility]



                                                                 [facility]

 

     ketorolac      0      Yes       [id number] 10mg      Take 1           [facility]

[facility]



     10 mg      2-08                               tablet by           [facility]



     tablet      00:00:                               mouth           [place]



               00                                 every 6           [facility]



                                                  (six)           [facility]



                                                  hours as           



                                                  needed for           



                                                  Pain           



                                                  (scale           



                                                  7-10).           

 

     tamsulosin      -0      Yes       [id number] .4mg      Take 1           [facility]

[facility]



     0.4 mg 24      2-08                               capsule by           [facility] 

of



     hr capsule      00:00:                               mouth at           [facility]

as



               00                                 bedtime.           [facility]



                                                                 [facility]

 

     ketorolac      -0      Yes       [id number] 10mg      Take 1           [facility]

[facility]



     10 mg      2-08                               tablet by           [facility]



     tablet      00:00:                               mouth           [place]



               00                                 every 6           [facility]



                                                  (six)           [facility]



                                                  hours as           



                                                  needed for           



                                                  Pain           



                                                  (scale           



                                                  7-10).           

 

     tamsulosin      -0      Yes       [id number] .4mg      Take 1           [facility]

[facility]



     0.4 mg 24      2-08                               capsule by           [facility] 

of



     hr capsule      00:00:                               mouth at           [facility]

as



               00                                 bedtime.           [facility]



                                                                 [facility]

 

     ketorolac      -0      Yes       [id number] 10mg      Take 1           [facility]

[facility]



     10 mg      2-08                               tablet by           [facility]



     tablet      00:00:                               mouth           [place]



               00                                 every 6           [facility]



                                                  (six)           [facility]



                                                  hours as           



                                                  needed for           



                                                  Pain           



                                                  (scale           



                                                  7-10).           

 

     tamsulosin      -0      Yes       [id number] .4mg      Take 1           [facility]

[facility]



     0.4 mg 24      2-08                               capsule by           [facility] 

of



     hr capsule      00:00:                               mouth at           [facility]

as



               00                                 bedtime.           [facility]



                                                                 [facility]

 

     ketorolac      -0      Yes       [id number] 10mg      Take 1           [facility]

[facility]



     10 mg      2-08                               tablet by           [facility]



     tablet      00:00:                               mouth           [place]



               00                                 every 6           [facility]



                                                  (six)           [facility]



                                                  hours as           



                                                  needed for           



                                                  Pain           



                                                  (scale           



                                                  7-10).           

 

     tamsulosin      -0      Yes       [id number] .4mg      Take 1           [facility]

[facility]



     0.4 mg 24      2-08                               capsule by           [facility] 

of



     hr capsule      00:00:                               mouth at           [facility]

as



               00                                 bedtime.           [facility]



                                                                 [facility]

 

     ketorolac      -0      Yes       [id number] 10mg      Take 1           [facility]

[facility]



     10 mg      2-08                               tablet by           [facility]



     tablet      00:00:                               mouth           [place]



               00                                 every 6           [facility]



                                                  (six)           [facility]



                                                  hours as           



                                                  needed for           



                                                  Pain           



                                                  (scale           



                                                  7-10).           

 

     tamsulosin      -0      Yes       [id number] .4mg      Take 1           [facility]

[facility]



     0.4 mg 24      2-08                               capsule by           [facility] 

of



     hr capsule      00:00:                               mouth at           [facility]

as



               00                                 bedtime.           [facility]



                                                                 [facility]

 

     ketorolac      -0      Yes       [id number] 10mg      Take 1           [facility]

[facility]



     10 mg      2-08                               tablet by           [facility]



     tablet      00:00:                               mouth           [place]



               00                                 every 6           [facility]



                                                  (six)           [facility]



                                                  hours as           



                                                  needed for           



                                                  Pain           



                                                  (scale           



                                                  7-10).           

 

     tamsulosin      2021-0      Yes       [id number] .4mg      Take 1           [facility]

[facility]



     0.4 mg 24      2-08                               capsule by           [facility] 

of



     hr capsule      00:00:                               mouth at           [facility]

as



               00                                 bedtime.           [facility]



                                                                 [facility]

 

     ketorolac      -0      Yes       [id number] 10mg      Take 1           [facility]

[facility]



     10 mg      2-08                               tablet by           [facility]



     tablet      00:00:                               mouth           [place]



               00                                 every 6           [facility]



                                                  (six)           [facility]



                                                  hours as           



                                                  needed for           



                                                  Pain           



                                                  (scale           



                                                  7-10).           

 

     tamsulosin      -0      Yes       [id number] .4mg      Take 1           [facility]

[facility]



     0.4 mg 24      2-08                               capsule by           [facility] 

of



     hr capsule      00:00:                               mouth at           [facility]

as



               00                                 bedtime.           [facility]



                                                                 [facility]

 

     ketorolac      -0      Yes       [id number] 10mg      Take 1           [facility]

[facility]



     10 mg      2-08                               tablet by           [facility]



     tablet      00:00:                               mouth           [place]



               00                                 every 6           [facility]



                                                  (six)           [facility]



                                                  hours as           



                                                  needed for           



                                                  Pain           



                                                  (scale           



                                                  7-10).           

 

     tamsulosin      -0      Yes       [id number] .4mg      Take 1           [facility]

[facility]



     0.4 mg 24      2-08                               capsule by           [facility] 

of



     hr capsule      00:00:                               mouth at           [facility]

as



               00                                 bedtime.           [facility]



                                                                 [facility]

 

     ketorolac      -0      Yes       [id number] 10mg      Take 1           [facility]

[facility]



     10 mg      2-08                               tablet by           [facility]



     tablet      00:00:                               mouth           [place]



               00                                 every 6           [facility]



                                                  (six)           [facility]



                                                  hours as           



                                                  needed for           



                                                  Pain           



                                                  (scale           



                                                  7-10).           

 

     tamsulosin      -0      Yes       [id number] .4mg      Take 1           [facility]

[facility]



     0.4 mg 24      2-08                               capsule by           [facility] 

of



     hr capsule      00:00:                               mouth at           [facility]

as



               00                                 bedtime.           [facility]



                                                                 [facility]

 

     ketorolac      -0      Yes       [id number] 10mg      Take 1           [facility]

[facility]



     10 mg      2-08                               tablet by           [facility]



     tablet      00:00:                               mouth           [place]



               00                                 every 6           [facility]



                                                  (six)           [facility]



                                                  hours as           



                                                  needed for           



                                                  Pain           



                                                  (scale           



                                                  7-10).           

 

     tamsulosin      2021-0      Yes       [id number] .4mg      Take 1           [facility]

[facility]



     0.4 mg 24      2-08                               capsule by           [facility] 

of



     hr capsule      00:00:                               mouth at           [facility]

as



               00                                 bedtime.           [facility]



                                                                 [facility]

 

     ketorolac      -0      Yes       [id number] 10mg      Take 1           [facility]

[facility]



     10 mg      2-08                               tablet by           [facility]



     tablet      00:00:                               mouth           [place]



               00                                 every 6           [facility]



                                                  (six)           [facility]



                                                  hours as           



                                                  needed for           



                                                  Pain           



                                                  (scale           



                                                  7-10).           

 

     tamsulosin      -0      Yes       [id number] .4mg      Take 1           [facility]

[facility]



     0.4 mg 24      2-08                               capsule by           [facility] 

of



     hr capsule      00:00:                               mouth at           [facility]

as



               00                                 bedtime.           [facility]



                                                                 [facility]

 

     ketorolac      -0      Yes       [id number] 10mg      Take 1           [facility]

[facility]



     10 mg      2-08                               tablet by           [facility]



     tablet      00:00:                               mouth           [place]



               00                                 every 6           [facility]



                                                  (six)           [facility]



                                                  hours as           



                                                  needed for           



                                                  Pain           



                                                  (scale           



                                                  7-10).           

 

     tamsulosin      -0      Yes       [id number] .4mg      Take 1           [facility]

[facility]



     0.4 mg 24      2-08                               capsule by           [facility] 

of



     hr capsule      00:00:                               mouth at           [facility]

as



               00                                 bedtime.           [facility]



                                                                 [facility]

 

     ketorolac      -0      Yes       [id number] 10mg      Take 1           [facility]

[facility]



     10 mg      2-08                               tablet by           [facility]



     tablet      00:00:                               mouth           [place]



               00                                 every 6           [facility]



                                                  (six)           [facility]



                                                  hours as           



                                                  needed for           



                                                  Pain           



                                                  (scale           



                                                  7-10).           

 

     tamsulosin      -0      Yes       [id number] .4mg      Take 1           [facility]

[facility]



     0.4 mg 24      2-08                               capsule by           [facility] 

of



     hr capsule      00:00:                               mouth at           [facility]

as



               00                                 bedtime.           [facility]



                                                                 [facility]

 

     ketorolac      -0      Yes       [id number] 10mg      Take 1           [facility]

[facility]



     10 mg      2-08                               tablet by           [facility]



     tablet      00:00:                               mouth           [place]



               00                                 every 6           [facility]



                                                  (six)           [facility]



                                                  hours as           



                                                  needed for           



                                                  Pain           



                                                  (scale           



                                                  7-10).           

 

     tamsulosin      -0      Yes       [id number] .4mg      Take 1           [facility]

[facility]



     0.4 mg 24      2-08                               capsule by           [facility] 

of



     hr capsule      00:00:                               mouth at           [facility]

as



               00                                 bedtime.           [facility]



                                                                 [facility]

 

     ketorolac      -0      Yes       [id number] 10mg      Take 1           [facility]

[facility]



     10 mg      2-08                               tablet by           [facility]



     tablet      00:00:                               mouth           [place]



               00                                 every 6           [facility]



                                                  (six)           [facility]



                                                  hours as           



                                                  needed for           



                                                  Pain           



                                                  (scale           



                                                  7-10).           

 

     tamsulosin      2021-0      Yes       [id number] .4mg      Take 1           [facility]

[facility]



     0.4 mg 24      2-08                               capsule by           [facility] 

of



     hr capsule      00:00:                               mouth at           [facility]

as



               00                                 bedtime.           [facility]



                                                                 [facility]

 

     ketorolac      2021-0      Yes       [id number] 10mg      Take 1           [facility]

[facility]



     10 mg      2-08                               tablet by           [facility]



     tablet      00:00:                               mouth           [place]



               00                                 every 6           [facility]



                                                  (six)           [facility]



                                                  hours as           



                                                  needed for           



                                                  Pain           



                                                  (scale           



                                                  7-10).           

 

     tamsulosin            Yes       [id number] .4mg      Take 1           [facility]

[facility]



     0.4 mg 24      2-08                               capsule by           [facility] 

of



     hr capsule      00:00:                               mouth at           [facility]

as



               00                                 bedtime.           [facility]



                                                                 [facility]

 

     ondansetron       202- No        [id number] 4mg       Take 1           

[facility]



     4 mg      2-08 04-07                          tablet by           [facility]



     disintegrat      00:00: 00:00                          mouth           [place]

[facility]



     ing tablet      00   :00                           every 8           [facility]

l



                                                  (eight)           [facility]



                                                  hours as           



                                                  needed for           



                                                  Nausea and           



                                                  Vomiting           



                                                  (N/V).           

 

     hydrOXYzine            Yes                      TAKE ONE           [facility]

[facility]



     25 mg      2-04                               (1)            [facility]



     capsule      00:00:                               CAPSULE(S)           [place]

[facility]



               00                                 BY MOUTH           [facility]



                                                  EVERY           [facility]



                                                  EIGHT           



                                                  HOURS AS           



                                                  NEEDED.           

 

     hydrOXYzine            Yes                      TAKE ONE           [facility]

[facility]



     25 mg      2-04                               (1)            [facility]



     capsule      00:00:                               CAPSULE(S)           [place]

[facility]



               00                                 BY MOUTH           [facility]



                                                  EVERY           [facility]



                                                  EIGHT           



                                                  HOURS AS           



                                                  NEEDED.           

 

     hydrOXYzine            Yes                      TAKE ONE           [facility]

[facility]



     25 mg      2-04                               (1)            [facility]



     capsule      00:00:                               CAPSULE(S)           [place]

[facility]



               00                                 BY MOUTH           [facility]



                                                  EVERY           [facility]



                                                  EIGHT           



                                                  HOURS AS           



                                                  NEEDED.           

 

     hydrOXYzine            Yes                      TAKE ONE           [facility]

[facility]



     25 mg      2-04                               (1)            [facility]



     capsule      00:00:                               CAPSULE(S)           [place]

[facility]



               00                                 BY MOUTH           [facility]



                                                  EVERY           [facility]



                                                  EIGHT           



                                                  HOURS AS           



                                                  NEEDED.           

 

     hydrOXYzine            Yes                      TAKE ONE           [facility]

[facility]



     25 mg      2-04                               (1)            [facility]



     capsule      00:00:                               CAPSULE(S)           [place]

[facility]



               00                                 BY MOUTH           [facility]



                                                  EVERY           [facility]



                                                  EIGHT           



                                                  HOURS AS           



                                                  NEEDED.           

 

     hydrOXYzine            Yes                      TAKE ONE           [facility]

[facility]



     25 mg      2-04                               (1)            [facility]



     capsule      00:00:                               CAPSULE(S)           [place]

[facility]



               00                                 BY MOUTH           Medical



                                                  EVERY           Branch



                                                  EIGHT           



                                                  HOURS AS           



                                                  NEEDED.           

 

     hydrOXYzine      0      Yes                      TAKE ONE           Un

[facility]



     25 mg      2-04                               (1)            ity of



     capsule      00:00:                               CAPSULE(S)           [facility]

s



               00                                 BY MOUTH           Medical



                                                  EVERY           Branch



                                                  EIGHT           



                                                  HOURS AS           



                                                  NEEDED.           

 

     hydrOXYzine            Yes                      TAKE ONE           Un

[facility]



     25 mg      2-04                               (1)            ity of



     capsule      00:00:                               CAPSULE(S)           [facility]

s



               00                                 BY MOUTH           Medical



                                                  EVERY           Branch



                                                  EIGHT           



                                                  HOURS AS           



                                                  NEEDED.           

 

     hydrOXYzine            Yes                      TAKE ONE           Un

[facility]



     25 mg      2-04                               (1)            ity of



     capsule      00:00:                               CAPSULE(S)           [facility]

s



               00                                 BY MOUTH           Medical



                                                  EVERY           Branch



                                                  EIGHT           



                                                  HOURS AS           



                                                  NEEDED.           

 

     hydrOXYzine            Yes                      TAKE ONE           Un

[facility]



     25 mg      2-04                               (1)            ity of



     capsule      00:00:                               CAPSULE(S)           [facility]

[facility]



               00                                 BY MOUTH           Medical



                                                  EVERY           Branch



                                                  EIGHT           



                                                  HOURS AS           



                                                  NEEDED.           

 

     hydrOXYzine            Yes                      TAKE ONE           Un

[facility]



     25 mg      2-04                               (1)            ity of



     capsule      00:00:                               CAPSULE(S)           [facility]

[facility]



               00                                 BY MOUTH           Medical



                                                  EVERY           Branch



                                                  EIGHT           



                                                  HOURS AS           



                                                  NEEDED.           

 

     hydrOXYzine            Yes                      TAKE ONE           Un

[facility]



     25 mg      2-04                               (1)            ity of



     capsule      00:00:                               CAPSULE(S)           [facility]

s



               00                                 BY MOUTH           Medical



                                                  EVERY           Branch



                                                  EIGHT           



                                                  HOURS AS           



                                                  NEEDED.           

 

     hydrOXYzine            Yes                      TAKE ONE           Un

[facility]



     25 mg      2-04                               (1)            ity of



     capsule      00:00:                               CAPSULE(S)           [facility]

s



               00                                 BY MOUTH           Medical



                                                  EVERY           Branch



                                                  EIGHT           



                                                  HOURS AS           



                                                  NEEDED.           

 

     hydrOXYzine            Yes                      TAKE ONE           Un

[facility]



     25 mg      2-04                               (1)            ity of



     capsule      00:00:                               CAPSULE(S)           [facility]

[facility]



               00                                 BY MOUTH           Medical



                                                  EVERY           Branch



                                                  EIGHT           



                                                  HOURS AS           



                                                  NEEDED.           

 

     hydrOXYzine            Yes                      TAKE ONE           Un

[facility]



     25 mg      2-04                               (1)            ity of



     capsule      00:00:                               CAPSULE(S)           [facility]

[facility]



               00                                 BY MOUTH           Medical



                                                  EVERY           Branch



                                                  EIGHT           



                                                  HOURS AS           



                                                  NEEDED.           

 

     hydrOXYzine            Yes                      TAKE ONE           Un

[facility]



     25 mg      2-04                               (1)            ity of



     capsule      00:00:                               CAPSULE(S)           [facility]

s



               00                                 BY MOUTH           Medical



                                                  EVERY           Branch



                                                  EIGHT           



                                                  HOURS AS           



                                                  NEEDED.           

 

     hydrOXYzine            Yes                      TAKE ONE           Un

[facility]



     25 mg      2-04                               (1)            ity of



     capsule      00:00:                               CAPSULE(S)           [facility]

s



               00                                 BY MOUTH           Medical



                                                  EVERY           Branch



                                                  EIGHT           



                                                  HOURS AS           



                                                  NEEDED.           

 

     hydrOXYzine            Yes                      TAKE ONE           Un

[facility]



     25 mg      2-04                               (1)            ity of



     capsule      00:00:                               CAPSULE(S)           [facility]

s



               00                                 BY MOUTH           Medical



                                                  EVERY           Branch



                                                  EIGHT           



                                                  HOURS AS           



                                                  NEEDED.           

 

     levothyroxi      2021-0      Yes                      TAKE ONE           Un

[facility]



     ne 25 mcg      1-08                               (1)            ity of



     tablet      00:00:                               TABLET(S)           [place]



               00                                 BY MOUTH           Medical



                                                  ONCE A DAY           Branch



                                                  IN THE           



                                                  MORNING ON           



                                                  AN EMPTY           



                                                  STOMACH.           

 

     levothyroxi      -0      Yes                      TAKE ONE           Un

[facility]



     ne 25 mcg      1-08                               (1)            ity of



     tablet      00:00:                               TABLET(S)           [place]



               00                                 BY MOUTH           Medical



                                                  ONCE A DAY           Branch



                                                  IN THE           



                                                  MORNING ON           



                                                  AN EMPTY           



                                                  STOMACH.           

 

     levothyroxi      -0      Yes                      TAKE ONE           Un

[facility]



     ne 25 mcg      1-08                               (1)            ity of



     tablet      00:00:                               TABLET(S)           [place]



               00                                 BY MOUTH           Medical



                                                  ONCE A DAY           Branch



                                                  IN THE           



                                                  MORNING ON           



                                                  AN EMPTY           



                                                  STOMACH.           

 

     levothyroxi      -0      Yes                      TAKE ONE           Un

[facility]



     ne 25 mcg      1-08                               (1)            ity of



     tablet      00:00:                               TABLET(S)           [place]



               00                                 BY MOUTH           Medical



                                                  ONCE A DAY           Branch



                                                  IN THE           



                                                  MORNING ON           



                                                  AN EMPTY           



                                                  STOMACH.           

 

     levothyroxi      -0      Yes                      TAKE ONE           Un

[facility]



     ne 25 mcg      1-08                               (1)            ity of



     tablet      00:00:                               TABLET(S)           [place]



               00                                 BY MOUTH           Medical



                                                  ONCE A DAY           Branch



                                                  IN THE           



                                                  MORNING ON           



                                                  AN EMPTY           



                                                  STOMACH.           

 

     levothyroxi      -0      Yes                      TAKE ONE           Un

[facility]



     ne 25 mcg      1-08                               (1)            ity of



     tablet      00:00:                               TABLET(S)           [place]



               00                                 BY MOUTH           Medical



                                                  ONCE A DAY           Branch



                                                  IN THE           



                                                  MORNING ON           



                                                  AN EMPTY           



                                                  STOMACH.           

 

     levothyroxi      -0      Yes                      TAKE ONE           Un

[facility]



     ne 25 mcg      1-08                               (1)            ity of



     tablet      00:00:                               TABLET(S)           [place]



               00                                 BY MOUTH           Medical



                                                  ONCE A DAY           Branch



                                                  IN THE           



                                                  MORNING ON           



                                                  AN EMPTY           



                                                  STOMACH.           

 

     levothyroxi      -0      Yes                      TAKE ONE           Un

[facility]



     ne 25 mcg      1-08                               (1)            ity of



     tablet      00:00:                               TABLET(S)           [place]



               00                                 BY MOUTH           Medical



                                                  ONCE A DAY           Branch



                                                  IN THE           



                                                  MORNING ON           



                                                  AN EMPTY           



                                                  STOMACH.           

 

     levothyroxi      -0      Yes                      TAKE ONE           Un

[facility]



     ne 25 mcg      1-08                               (1)            ity of



     tablet      00:00:                               TABLET(S)           [place]



               00                                 BY MOUTH           Medical



                                                  ONCE A DAY           Branch



                                                  IN THE           



                                                  MORNING ON           



                                                  AN EMPTY           



                                                  STOMACH.           

 

     levothyroxi      -0      Yes                      TAKE ONE           Un

[facility]



     ne 25 mcg      1-08                               (1)            ity of



     tablet      00:00:                               TABLET(S)           [place]



               00                                 BY MOUTH           Medical



                                                  ONCE A DAY           Branch



                                                  IN THE           



                                                  MORNING ON           



                                                  AN EMPTY           



                                                  STOMACH.           

 

     levothyroxi      0      Yes                      TAKE ONE           Un

[facility]



     ne 25 mcg      1-08                               (1)            ity of



     tablet      00:00:                               TABLET(S)           [place]



               00                                 BY MOUTH           Medical



                                                  ONCE A DAY           Branch



                                                  IN THE           



                                                  MORNING ON           



                                                  AN EMPTY           



                                                  STOMACH.           

 

     levothyroxi      0      Yes                      TAKE ONE           Un

[facility]



     ne 25 mcg      1-08                               (1)            ity of



     tablet      00:00:                               TABLET(S)           [place]



               00                                 BY MOUTH           Medical



                                                  ONCE A DAY           Branch



                                                  IN THE           



                                                  MORNING ON           



                                                  AN EMPTY           



                                                  STOMACH.           

 

     levothyroxi      0      Yes                      TAKE ONE           Un

[facility]



     ne 25 mcg      1-08                               (1)            ity of



     tablet      00:00:                               TABLET(S)           [place]



               00                                 BY MOUTH           Medical



                                                  ONCE A DAY           Branch



                                                  IN THE           



                                                  MORNING ON           



                                                  AN EMPTY           



                                                  STOMACH.           

 

     levothyroxi            Yes                      TAKE ONE           Un

[facility]



     ne 25 mcg      1-08                               (1)            ity of



     tablet      00:00:                               TABLET(S)           [place]



               00                                 BY MOUTH           Medical



                                                  ONCE A DAY           Branch



                                                  IN THE           



                                                  MORNING ON           



                                                  AN EMPTY           



                                                  STOMACH.           

 

     levothyroxi            Yes                      TAKE ONE           Un

[facility]



     ne 25 mcg      1-08                               (1)            ity of



     tablet      00:00:                               TABLET(S)           [place]



               00                                 BY MOUTH           Medical



                                                  ONCE A DAY           Branch



                                                  IN THE           



                                                  MORNING ON           



                                                  AN EMPTY           



                                                  STOMACH.           

 

     levothyroxi            Yes                      TAKE ONE           Un

[facility]



     ne 25 mcg      1-08                               (1)            ity of



     tablet      00:00:                               TABLET(S)           [place]



               00                                 BY MOUTH           Medical



                                                  ONCE A DAY           Branch



                                                  IN THE           



                                                  MORNING ON           



                                                  AN EMPTY           



                                                  STOMACH.           

 

     levothyroxi            Yes                      TAKE ONE           Un

[facility]



     ne 25 mcg      1-08                               (1)            ity of



     tablet      00:00:                               TABLET(S)           [place]



               00                                 BY MOUTH           Medical



                                                  ONCE A DAY           Branch



                                                  IN THE           



                                                  MORNING ON           



                                                  AN EMPTY           



                                                  STOMACH.           

 

     levothyroxi      0      Yes                      TAKE ONE           Un

[facility]



     ne 25 mcg      1-08                               (1)            ity of



     tablet      00:00:                               TABLET(S)           [place]



               00                                 BY MOUTH           Medical



                                                  ONCE A DAY           Branch



                                                  IN THE           



                                                  MORNING ON           



                                                  AN EMPTY           



                                                  STOMACH.           

 

     Amphetamine Amphetamine 2020-1      No             1{table BID  Amphetamin 

          Common



     -Dextroamph -Dextroamph 2-30                     t}        e-Dextroam      

     [facility]



     etamine 10 etamine 10 00:00:                               phetamine       

    - [facility]



     MG   MG   00                                 10 MG           [facility]

 

     Amphetamine Amphetamine -1      No             1{table BID  Amphetamin 

          Common



     -Dextroamph -Dextroamph 2-30                     t}        e-Dextroam      

     [facility]



     etamine 10 etamine 10 00:00:                               phetamine       

    - [facility]



     MG   MG   00                                 10 MG           [facility]

 

     Amphetamine Amphetamine 2020-      No             1{table BID  Amphetamin 

          



     -Dextroamph -Dextroamph 2-30                     t}        e-Dextroam      

     



     etamine 10 etamine 10 00:00:                               phetamine       

    



     MG   MG   00                                 10 MG           

 

     Amphetamine Amphetamine 2020-      No             1{table BID  Amphetamin 

          



     -Dextroamph -Dextroamph 2-30                     t}        e-Dextroam      

     



     etamine 10 etamine 10 00:00:                               phetamine       

    



     MG   MG   00                                 10 MG           

 

     Amphetamine Amphetamine -      No             1{table BID  Amphetamin 

          



     -Dextroamph -Dextroamph 2-30                     t}        e-Dextroam      

     



     etamine 10 etamine 10 00:00:                               phetamine       

    



     MG   MG   00                                 10 MG           

 

     Amphetamine Amphetamine -      No             1{table BID  Amphetamin 

          



     -Dextroamph -Dextroamph 2-30                     t}        e-Dextroam      

     



     etamine 10 etamine 10 00:00:                               phetamine       

    



     MG   MG   00                                 10 MG           

 

     Amphetamine Amphetamine 2020-      No             1{table BID  Amphetamin 

          



     -Dextroamph -Dextroamph 2-30                     t}        e-Dextroam      

     



     etamine 10 etamine 10 00:00:                               phetamine       

    



     MG   MG   00                                 10 MG           

 

     Amphetamine Amphetamine -      No             1{table BID  Amphetamin 

          



     -Dextroamph -Dextroamph 2-30                     t}        e-Dextroam      

     



     etamine 10 etamine 10 00:00:                               phetamine       

    



     MG   MG   00                                 10 MG           

 

     Amphetamine Amphetamine 2020-      No             1{table BID  Amphetamin 

          



     -Dextroamph -Dextroamph 2-30                     t}        e-Dextroam      

     



     etamine 10 etamine 10 00:00:                               phetamine       

    



     MG   MG   00                                 10 MG           

 

     Amphetamine Amphetamine 2020-      No             1{table BID  Amphetamin 

          



     -Dextroamph -Dextroamph 2-30                     t}        e-Dextroam      

     



     etamine 10 etamine 10 00:00:                               phetamine       

    



     MG   MG   00                                 10 MG           

 

     Amphetamine Amphetamine 2020-      No             1{table BID  Amphetamin 

          



     -Dextroamph -Dextroamph 2-30                     t}        e-Dextroam      

     



     etamine 10 etamine 10 00:00:                               phetamine       

    



     MG   MG   00                                 10 MG           

 

     Amphetamine Amphetamine 2020-      No             1{table BID  Amphetamin 

          



     -Dextroamph -Dextroamph 2-30                     t}        e-Dextroam      

     



     etamine 10 etamine 10 00:00:                               phetamine       

    



     MG   MG   00                                 10 MG           

 

     Amphetamine Amphetamine 2020      No             1{table BID  Amphetamin 

          



     -Dextroamph -Dextroamph 2-30                     t}        e-Dextroam      

     



     etamine 10 etamine 10 00:00:                               phetamine       

    



     MG   MG   00                                 10 MG           

 

     Amphetamine Amphetamine 2020      No             1{table BID  Amphetamin 

          



     -Dextroamph -Dextroamph 2-30                     t}        e-Dextroam      

     



     etamine 10 etamine 10 00:00:                               phetamine       

    



     MG   MG   00                                 10 MG           

 

     Amphetamine Amphetamine 2020      No             1{table BID  Amphetamin 

          



     -Dextroamph -Dextroamph 2-30                     t}        e-Dextroam      

     



     etamine 10 etamine 10 00:00:                               phetamine       

    



     MG   MG   00                                 10 MG           

 

     Amphetamine Amphetamine 2020      No             1{table BID  Amphetamin 

          Common



     -Dextroamph -Dextroamph 2-30                     t}        e-Dextroam      

     [facility]



     etamine 10 etamine 10 00:00:                               phetamine       

    - [facility]



     MG   MG   00                                 10 MG           [facility]

 

     albuterol      2020      Yes                      INHALE ONE           Un

[facility]



     90        2-17                               (1) PUFF           ity of



     mcg/actuati      00:00:                               BY MOUTH           [facility]

[place]



     on inhaler      00                                 EVERY 4-6           [facility]

[facility]



                                                  HOURS AS           Branch



                                                  NEEDED FOR           



                                                  COUGH/SHOR           



                                                  TNESS OF           



                                                  BREATH.           

 

     albuterol      2020      Yes                      INHALE ONE           Un

[facility]



     90        2-17                               (1) PUFF           ity of



     mcg/actuati      00:00:                               BY MOUTH           [facility]

[place]



     on inhaler      00                                 EVERY 4-6           [facility]

[facility]



                                                  HOURS AS           Branch



                                                  NEEDED FOR           



                                                  COUGH/SHOR           



                                                  TNESS OF           



                                                  BREATH.           

 

     albuterol      2020      Yes                      INHALE ONE           Un

[facility]



     90        2-17                               (1) PUFF           ity of



     mcg/actuati      00:00:                               BY MOUTH           [facility]

[place]



     on inhaler      00                                 EVERY 4-6           [facility]

[facility]



                                                  HOURS AS           Branch



                                                  NEEDED FOR           



                                                  COUGH/SHOR           



                                                  TNESS OF           



                                                  BREATH.           

 

     albuterol      2020      Yes                      INHALE ONE           Un

[facility]



     90        2-17                               (1) PUFF           ity of



     mcg/actuati      00:00:                               BY MOUTH           [facility]

[place]



     on inhaler      00                                 EVERY 4-6           [facility]

[facility]



                                                  HOURS AS           Branch



                                                  NEEDED FOR           



                                                  COUGH/SHOR           



                                                  TNESS OF           



                                                  BREATH.           

 

     albuterol      2020      Yes                      INHALE ONE           Un

[facility]



     90        2-17                               (1) PUFF           ity of



     mcg/actuati      00:00:                               BY MOUTH           [facility]

[place]



     on inhaler      00                                 EVERY 4-6           [facility]

[facility]



                                                  HOURS AS           Branch



                                                  NEEDED FOR           



                                                  COUGH/SHOR           



                                                  TNESS OF           



                                                  BREATH.           

 

     albuterol      2020      Yes                      INHALE ONE           Un

[facility]



     90        2-17                               (1) PUFF           ity of



     mcg/actuati      00:00:                               BY MOUTH           [facility]

[place]



     on inhaler      00                                 EVERY 4-6           [facility]

[facility]



                                                  HOURS AS           Branch



                                                  NEEDED FOR           



                                                  COUGH/SHOR           



                                                  TNESS OF           



                                                  BREATH.           

 

     albuterol      2020      Yes                      INHALE ONE           Un

[facility]



     90        2-17                               (1) PUFF           ity of



     mcg/actuati      00:00:                               BY MOUTH           [facility]

[place]



     on inhaler      00                                 EVERY 4-6           [facility]

[facility]



                                                  HOURS AS           Branch



                                                  NEEDED FOR           



                                                  COUGH/SHOR           



                                                  TNESS OF           



                                                  BREATH.           

 

     albuterol      2020      Yes                      INHALE ONE           Un

[facility]



     90        2-17                               (1) PUFF           ity of



     mcg/actuati      00:00:                               BY MOUTH           [facility]

[place]



     on inhaler      00                                 EVERY 4-6           [facility]

[facility]



                                                  HOURS AS           Branch



                                                  NEEDED FOR           



                                                  COUGH/SHOR           



                                                  TNESS OF           



                                                  BREATH.           

 

     albuterol      2020      Yes                      INHALE ONE           Un

[facility]



     90        2-17                               (1) PUFF           ity of



     mcg/actuati      00:00:                               BY MOUTH           [facility]

[place]



     on inhaler      00                                 EVERY 4-6           Medi

[facility]



                                                  HOURS AS           Branch



                                                  NEEDED FOR           



                                                  COUGH/SHOR           



                                                  TNESS OF           



                                                  BREATH.           

 

     albuterol      2020      Yes                      INHALE ONE           Un

[facility]



     90        2-17                               (1) PUFF           ity of



     mcg/actuati      00:00:                               BY MOUTH           [facility]

[place]



     on inhaler      00                                 EVERY 4-6           Medi

[facility]



                                                  HOURS AS           Branch



                                                  NEEDED FOR           



                                                  COUGH/SHOR           



                                                  TNESS OF           



                                                  BREATH.           

 

     albuterol      2020      Yes                      INHALE ONE           Un

[facility]



     90        2-17                               (1) PUFF           ity of



     mcg/actuati      00:00:                               BY MOUTH           [facility]

[place]



     on inhaler      00                                 EVERY 4-6           [facility]i

[facility]



                                                  HOURS AS           Branch



                                                  NEEDED FOR           



                                                  COUGH/SHOR           



                                                  TNESS OF           



                                                  BREATH.           

 

     albuterol      2020      Yes                      INHALE ONE           Un

[facility]



     90        2-17                               (1) PUFF           ity of



     mcg/actuati      00:00:                               BY MOUTH           [facility]

[place]



     on inhaler      00                                 EVERY 4-6           [facility]

[facility]



                                                  HOURS AS           Branch



                                                  NEEDED FOR           



                                                  COUGH/SHOR           



                                                  TNESS OF           



                                                  BREATH.           

 

     albuterol      2020      Yes                      INHALE ONE           Un

[facility]



     90        2-17                               (1) PUFF           ity of



     mcg/actuati      00:00:                               BY MOUTH           [facility]

[place]



     on inhaler      00                                 EVERY 4-6           [facility]i

[facility]



                                                  HOURS AS           Branch



                                                  NEEDED FOR           



                                                  COUGH/SHOR           



                                                  TNESS OF           



                                                  BREATH.           

 

     albuterol      2020      Yes                      INHALE ONE           Un

[facility]



     90        2-17                               (1) PUFF           ity of



     mcg/actuati      00:00:                               BY MOUTH           [facility]

[place]



     on inhaler      00                                 EVERY 4-6           Medi

[facility]



                                                  HOURS AS           Branch



                                                  NEEDED FOR           



                                                  COUGH/SHOR           



                                                  TNESS OF           



                                                  BREATH.           

 

     albuterol      2020      Yes                      INHALE ONE           Un

[facility]



     90        2-17                               (1) PUFF           ity of



     mcg/actuati      00:00:                               BY MOUTH           [facility]

[place]



     on inhaler      00                                 EVERY 4-6           Medi

[facility]



                                                  HOURS AS           Branch



                                                  NEEDED FOR           



                                                  COUGH/SHOR           



                                                  TNESS OF           



                                                  BREATH.           

 

     albuterol      2020      Yes                      INHALE ONE           Un

[facility]



     90        2-17                               (1) PUFF           ity of



     mcg/actuati      00:00:                               BY MOUTH           [facility]

[place]



     on inhaler      00                                 EVERY 4-6           Medi

[facility]



                                                  HOURS AS           Branch



                                                  NEEDED FOR           



                                                  COUGH/SHOR           



                                                  TNESS OF           



                                                  BREATH.           

 

     albuterol      2020      Yes                      INHALE ONE           Un

[facility]



     90        2-17                               (1) PUFF           ity of



     mcg/actuati      00:00:                               BY MOUTH           [facility]

[place]



     on inhaler      00                                 EVERY 4-6           Medi

[facility]



                                                  HOURS AS           Branch



                                                  NEEDED FOR           



                                                  COUGH/SHOR           



                                                  TNESS OF           



                                                  BREATH.           

 

     albuterol      2020      Yes                      INHALE ONE           Un

[facility]



     90        2-17                               (1) PUFF           ity of



     mcg/actuati      00:00:                               BY MOUTH           [facility]

[place]



     on inhaler      00                                 EVERY 4-6           Medi

[facility]



                                                  HOURS AS           Branch



                                                  NEEDED FOR           



                                                  COUGH/SHOR           



                                                  TNESS OF           



                                                  BREATH.           

 

     methylPREDN      2020      Yes                      USE AS           Univ

ers



     ISolone 4      2-16                               DIRECTED           ity of



     mg tablets      00:00:                               BY PACKAGE           [facility]

[facility]



               00                                 INSTRUCTIO           Medical



                                                  NS.            Branch

 

     methylPREDN      2020      Yes                      USE AS           Univ

ers



     ISolone 4      -16                               DIRECTED           ity of



     mg tablets      00:00:                               BY PACKAGE           T

[facility]



               00                                 INSTRUCTIO           Medical



                                                  NS.            Branch

 

     methylPREDN      2020- No                       USE AS           Uni

vers



     ISolone 4      2-16 04-07                          DIRECTED           ity o

f



     mg tablets      00:00: 00:00                          BY PACKAGE           

[place]



               00   :00                           INSTRUCTIO           Medical



                                                  NS.            Branch

 

     ProAir HFA ProAir HFA 2020- No                       ProAir HFA     

      



     108 (90 108 (90 2-16 15                          108 (90           



     Base) Base) 00:00: 00:00                          Base)           



     MCG/ACT MCG/ACT 00   :00                           MCG/ACT           

 

     ProAir HFA ProAir HFA 2020- No                       ProAir HFA     

      



     108 (90 108 (90 2-16 01-15                          108 (90           



     Base) Base) 00:00: 00:00                          Base)           



     MCG/ACT MCG/ACT 00   :00                           MCG/ACT           

 

     ProAir HFA ProAir HFA 2020- No                       ProAir HFA     

      



     108 (90 108 (90 2-16 01-15                          108 (90           



     Base) Base) 00:00: 00:00                          Base)           



     MCG/ACT MCG/ACT 00   :00                           MCG/ACT           

 

     ProAir HFA ProAir HFA 2020- No                       ProAir HFA     

      



     108 (90 108 (90 2-16 01-15                          108 (90           



     Base) Base) 00:00: 00:00                          Base)           



     MCG/ACT MCG/ACT 00   :00                           MCG/ACT           

 

     ProAir HFA ProAir HFA 2020- No                       ProAir HFA     

      



     108 (90 108 (90 2-16 01-15                          108 (90           



     Base) Base) 00:00: 00:00                          Base)           



     MCG/ACT MCG/ACT 00   :00                           MCG/ACT           

 

     Medrol 4 MG Medrol 4 MG 2020- No                       Medrol 4     

      



               2-16 12-22                          MG             



               00:00: 00:00                                         



               00   :00                                          

 

     Medrol 4 MG Medrol 4 MG 2020- No                       Medrol 4     

      



               2-16 12-22                          MG             



               00:00: 00:00                                         



               00   :00                                          

 

     benzonatate      2020      Yes                      TAKE ONE           Un

[facility]



     100 mg      2-12                               (1)            ity of



     capsule      00:00:                               CAPSULE(S)           [place]

[facility]



               00                                 BY MOUTH           Medical



                                                  THREE           Branch



                                                  TIMES A           



                                                  DAY AS           



                                                  NEEDED FOR           



                                                  COUGH.           

 

     benzonatate      2020      Yes                      TAKE ONE           Un

[facility]



     100 mg      2-12                               (1)            ity of



     capsule      00:00:                               CAPSULE(S)           [place]

[facility]



               00                                 BY MOUTH           Medical



                                                  THREE           Branch



                                                  TIMES A           



                                                  DAY AS           



                                                  NEEDED FOR           



                                                  COUGH.           

 

     benzonatate      2020- No                       TAKE ONE           U

nivers



     100 mg      2-12 04-07                          (1)            ity of



     capsule      00:00: 00:00                          CAPSULE(S)           [facility]

[facility]



               00   :00                           BY MOUTH           Medical



                                                  THREE           Branch



                                                  TIMES A           



                                                  DAY AS           



                                                  NEEDED FOR           



                                                  COUGH.           

 

     benzonatate      2020      Yes       [id number] 100mg      Take 1         

  [facility]



     100 mg      2-11                               capsule by           ity of



     capsule      00:00:                               mouth 3           [place]



               00                                 (three)           Medical



                                                  times           Branch



                                                  daily as           



                                                  needed for           



                                                  Cough.           

 

     chlorphenir      2020      Yes       [id number] 4mg       Take 1          

 [facility]



     amine 4 mg      2-11                               tablet by           ity 

of



     tablet      00:00:                               mouth           [place]



               00                                 every 6           Medical



                                                  (six)           Branch



                                                  hours as           



                                                  needed for           



                                                  Allergies           



                                                  or Runny           



                                                  nose.           

 

     multivitami      2020      Yes       [id number] 1{capsu      Take 1       

    [facility]



     n capsule      2-11                     le}       capsule by           ity 

of



               00:00:                               mouth           [place]



               00                                 daily.           Medical



                                                                 Branch

 

     calcium-mag      2020      Yes       [id number]           Take as         

  [facility]



     nesium-zinc      2-11                               directed           ity 

of



     [phone]      00:00:                               for daily           [facility]

[facility]



     mg Tab      00                                 dose.           Medical



                                                                 Branch

 

     ondansetron      2020      Yes       [id number] 4mg       Take 1          

 [facility]



     4 mg      2-11                               tablet by           ity of



     disintegrat      00:00:                               mouth           [place]



     ing tablet      00                                 every 8           Med[facility]

l



                                                  (eight)           Branch



                                                  hours as           



                                                  needed for           



                                                  Nausea and           



                                                  Vomiting           



                                                  (N/V).           

 

     azithromyci      2020      Yes                                     [facility]

s



     n 500 mg      2-08                                              ity of



     tablet      00:00:                                              [place]



               00                                                Medical



                                                                 Branch

 

     azithromyci      2020      Yes                                     Un[facility]

s



     n 500 mg      2-08                                              ity of



     tablet      00:00:                                              [place]



               00                                                Medical



                                                                 Branch

 

     azithromyci      2020- No                                      Unive

rs



     n 500 mg      2-08 04-07                                         ity of



     tablet      00:00: 00:00                                         [place]



               00   :00                                          Medical



                                                                 Branch

 

     Azithromyci Azithromyci 2020 2020- No             1{table QD   Azithromyc

           Common



     n 500 MG n 500 MG 2--13                t}        in 500 MG           S

[facility]



               00:00: 00:00                                         [facility]



               00   :00                                          [facility]

 

     Azithromyci Azithromyci - 2020- No             1{table QD   Azithromyc

           Common



     n 500 MG n 500 MG 2--13                t}        in 500 MG           S

[facility]



               00:00: 00:00                                         [facility]



               00   :00                                          [facility]

 

     Azithromyci Azithromyci - 2020- No             1{table QD   Azithromyc

           Common



     n 500 MG n 500 MG 2--13                t}        in 500 MG           S

[facility]



               00:00: 00:00                                         [facility]



               00   :00                                          [facility]

 

     Adderall 10 Adderall 10 2020      No             1{table BID  Adderall   

        Common



     MG   MG   1-30                     t}        10 MG           [facility]



               00:00:                                              [facility]



               00                                                [facility]

 

     Adderall 10 Adderall 10 2020      No             1{table BID  Adderall   

        Common



     MG   MG   1-30                     t}        10 MG           [facility]



               00:00:                                              [facility]



               00                                                [facility]

 

     Adderall 10 Adderall 10 2020      No             1{table BID  Adderall   

        



     MG   MG   1-30                     t}        10 MG           



               00:00:                                              



               00                                                

 

     Adderall 10 Adderall 10 2020      No             1{table BID  Adderall   

        



     MG   MG   1-30                     t}        10 MG           



               00:00:                                              



               00                                                

 

     Adderall 10 Adderall 10 2020      No             1{table BID  Adderall   

        



     MG   MG   1-30                     t}        10 MG           



               00:00:                                              



               00                                                

 

     Adderall 10 Adderall 10 2020      No             1{table BID  Adderall   

        



     MG   MG   1-30                     t}        10 MG           



               00:00:                                              



               00                                                

 

     Adderall 10 Adderall 10 2020      No             1{table BID  Adderall   

        



     MG   MG   1-30                     t}        10 MG           



               00:00:                                              



               00                                                

 

     Adderall 10 Adderall 10 2020      No             1{table BID  Adderall   

        



     MG   MG   1-30                     t}        10 MG           



               00:00:                                              



               00                                                

 

     Adderall 10 Adderall 10 2020      No             1{table BID  Adderall   

        



     MG   MG   1-30                     t}        10 MG           



               00:00:                                              



               00                                                

 

     Adderall 10 Adderall 10 2020      No             1{table BID  Adderall   

        



     MG   MG   1-30                     t}        10 MG           



               00:00:                                              



               00                                                

 

     Adderall 10 Adderall 10 2020      No             1{table BID  Adderall   

        



     MG   MG   1-30                     t}        10 MG           



               00:00:                                              



               00                                                

 

     Adderall 10 Adderall 10 2020      No             1{table BID  Adderall   

        



     MG   MG   1-30                     t}        10 MG           



               00:00:                                              



               00                                                

 

     Adderall 10 Adderall 10 2020      No             1{table BID  Adderall   

        



     MG   MG   1-30                     t}        10 MG           



               00:00:                                              



               00                                                

 

     Adderall 10 Adderall 10 2020      No             1{table BID  Adderall   

        



     MG   MG   1-30                     t}        10 MG           



               00:00:                                              



               00                                                

 

     Adderall 10 Adderall 10 2020-1      No             1{table BID  Adderall   

        



     MG   MG   1-30                     t}        10 MG           



               00:00:                                              



               00                                                

 

     Adderall 10 Adderall 10 2020-1      No             1{table BID  Adderall   

        Common



     MG   MG   1-30                     t}        10 MG           [facility]



               00:00:                                                                                              [facility]

 

     Amphetamine Amphetamine 2020-1      Yes  Scott                1 tablet    

       Common



     -Dextroamph -Dextroamph 0-12           [place]                               

[facility]



     etamine etamine 00:00:                                                                                              [facility]

 

     Adderall Adderall 2020-0      Yes  Scott                1 tablet          

 Common



               9-15           [place]                in the           [facility]



               00:00:                               morning                                                           [facility]

 

     HydrOXYzine HydrOXYzine 2020-0      Yes  Scott                1 capsule   

        Common



     Pamoate Pamoate 9-15           [place]                as needed           [facility]

rit



               00:00:                                                                                              [facility]

 

     Gabapentin Gabapentin 2020-0      Yes  Scott                1 capsule     

      Common



               3-17           [place]                once           [facility]



               00:00:                               today,                                            then 1           [facility]

 

     Gabapentin Gabapentin 2020-0      No                  BID  Gabapentin      

     Common



     300  MG 3-17                               300 MG           [facility]



               00:00:                                                                                              [facility]

 

     Gabapentin Gabapentin 2020-0      No                  BID  Gabapentin      

     Common



     300  MG 3-17                               300 MG           [facility]



               00:00:                                                                                              [facility]

 

     Gabapentin Gabapentin 2020-0      No                  BID  Gabapentin      

     



     300  MG 3-17                               300 MG           



               00:00:                                              



               00                                                

 

     Gabapentin Gabapentin 2020-0      No                  BID  Gabapentin      

     



     300  MG 3-17                               300 MG           



               00:00:                                              



               00                                                

 

     Gabapentin Gabapentin 2020-0      No                  BID  Gabapentin      

     



     300  MG 3-17                               300 MG           



               00:00:                                              



               00                                                

 

     Gabapentin Gabapentin 2020-0      No                  BID  Gabapentin      

     



     300  MG 3-17                               300 MG           



               00:00:                                              



               00                                                

 

     Gabapentin Gabapentin 2020-0      No                  BID  Gabapentin      

     



     300  MG 3-17                               300 MG           



               00:00:                                              



               00                                                

 

     Gabapentin Gabapentin 2020-0      No                  BID  Gabapentin      

     



     300  MG 3-17                               300 MG           



               00:00:                                              



               00                                                

 

     Gabapentin Gabapentin 2020-0      No                  BID  Gabapentin      

     



     300  MG 3-17                               300 MG           



               00:00:                                              



               00                                                

 

     Gabapentin Gabapentin 2020-0      No                  BID  Gabapentin      

     



     300  MG 3-17                               300 MG           



               00:00:                                              



               00                                                

 

     Gabapentin Gabapentin 2020-0      No                  BID  Gabapentin      

     



     300  MG 3-17                               300 MG           



               00:00:                                              



               00                                                

 

     Gabapentin Gabapentin 2020-0      No                  BID  Gabapentin      

     



     300  MG 3-17                               300 MG           



               00:00:                                              



               00                                                

 

     Gabapentin Gabapentin 2020-0      No                  BID  Gabapentin      

     



     300  MG 3-17                               300 MG           



               00:00:                                              



               00                                                

 

     Gabapentin Gabapentin 2020-0      No                  BID  Gabapentin      

     



     300  MG 3-17                               300 MG           



               00:00:                                              



               00                                                

 

     Gabapentin Gabapentin 2020-0      No                  BID  Gabapentin      

     



     300  MG 3-17                               300 MG           



               00:00:                                              



               00                                                

 

     Gabapentin Gabapentin 2020-0      No                  BID  Gabapentin      

     



     300  MG 3-17                               300 MG           



               00:00:                                              



               00                                                

 

     Gabapentin Gabapentin 2020-0      No                  BID  Gabapentin      

     



     300  MG 3-17                               300 MG           



               00:00:                                              



               00                                                

 

     Gabapentin Gabapentin 2020-0      No                  BID  Gabapentin      

     



     300  MG 3-17                               300 MG           



               00:00:                                              



               00                                                

 

     Gabapentin Gabapentin 2020-0      No                  BID  Gabapentin      

     



     300  MG 3-17                               300 MG           



               00:00:                                              



               00                                                

 

     Gabapentin Gabapentin 2020-0      No                  BID  Gabapentin      

     



     300  MG 3-17                               300 MG           



               00:00:                                              



               00                                                

 

     Gabapentin Gabapentin 2020-0      No                  BID  Gabapentin      

     Common



     300  MG 3-17                               300 MG           [facility]



               00:00:                                              [facility]



               [facility]                                                [facility]

 

     Valacyclovi Valacyclovi -0      Yes  Scott                1 tablet    

       Common



     r HCl r HCl 3-13           [place]                               [facility]



               00:00:                                              [facility]



               00                                                [facility]

 

     Valacyclovi Valacyclovi -0      No             1{table TID  Valacyclov 

          Common



     r HCl 1 GM r HCl 1 GM 3-13                     t}        ir HCl 1          

 [facility]



               00:00:                               GM             - [facility]



               00                                                [facility]

 

     Valacyclovi Valacyclovi -0      No             1{table TID  Valacyclov 

          Common



     r HCl 1 GM r HCl 1 GM 3-13                     t}        ir HCl 1          

 [facility]



               00:00:                               GM             - [facility]



               00                                                [facility]

 

     Valacyclovi Valacyclovi -0      No             1{table TID  Valacyclov 

          



     r HCl 1 GM r HCl 1 GM 3-13                     t}        ir HCl 1          

 



               00:00:                               GM             



               00                                                

 

     Valacyclovi Valacyclovi -0      No             1{table TID  Valacyclov 

          



     r HCl 1 GM r HCl 1 GM 3-13                     t}        ir HCl 1          

 



               00:00:                               GM             



               00                                                

 

     Valacyclovi Valacyclovi -0      No             1{table TID  Valacyclov 

          



     r HCl 1 GM r HCl 1 GM 3-13                     t}        ir HCl 1          

 



               00:00:                               GM             



               00                                                

 

     Valacyclovi Valacyclovi -0      No             1{table TID  Valacyclov 

          



     r HCl 1 GM r HCl 1 GM 3-13                     t}        ir HCl 1          

 



               00:00:                               GM             



               00                                                

 

     Valacyclovi Valacyclovi 2020-0      No             1{table TID  Valacyclov 

          



     r HCl 1 GM r HCl 1 GM 3-13                     t}        ir HCl 1          

 



               00:00:                               GM             



               00                                                

 

     Valacyclovi Valacyclovi 2020-0      No             1{table TID  Valacyclov 

          



     r HCl 1 GM r HCl 1 GM 3-13                     t}        ir HCl 1          

 



               00:00:                               GM             



               00                                                

 

     Valacyclovi Valacyclovi 2020-0      No             1{table TID  Valacyclov 

          



     r HCl 1 GM r HCl 1 GM 3-13                     t}        ir HCl 1          

 



               00:00:                               GM             



               00                                                

 

     Valacyclovi Valacyclovi 2020-0      No             1{table TID  Valacyclov 

          



     r HCl 1 GM r HCl 1 GM 3-13                     t}        ir HCl 1          

 



               00:00:                               GM             



               00                                                

 

     Valacyclovi Valacyclovi 2020-0      No             1{table TID  Valacyclov 

          



     r HCl 1 GM r HCl 1 GM 3-13                     t}        ir HCl 1          

 



               00:00:                               GM             



               00                                                

 

     Valacyclovi Valacyclovi 2020-0      No             1{table TID  Valacyclov 

          



     r HCl 1 GM r HCl 1 GM 3-13                     t}        ir HCl 1          

 



               00:00:                               GM             



               00                                                

 

     Valacyclovi Valacyclovi 2020-0      No             1{table TID  Valacyclov 

          



     r HCl 1 GM r HCl 1 GM 3-13                     t}        ir HCl 1          

 



               00:00:                               GM             



               00                                                

 

     valACYclovi valACYclovi 2020-0      No             1{table TID  valACYclov 

          



     r HCl 1 GM r HCl 1 GM 3-13                     t}        ir HCl 1          

 



               00:00:                               GM             



               00                                                

 

     valACYclovi valACYclovi 2020-0      No             1{table TID  valACYclov 

          



     r HCl 1 GM r HCl 1 GM 3-13                     t}        ir HCl 1          

 



               00:00:                               GM             



               00                                                

 

     valACYclovi valACYclovi 2020-0      No             1{table TID  valACYclov 

          



     r HCl 1 GM r HCl 1 GM 3-13                     t}        ir HCl 1          

 



               00:00:                               GM             



               00                                                

 

     valACYclovi valACYclovi 2020-0      No             1{table TID  valACYclov 

          



     r HCl 1 GM r HCl 1 GM 3-13                     t}        ir HCl 1          

 



               00:00:                               GM             



               00                                                

 

     valACYclovi valACYclovi 2020-0      No             1{table TID  valACYclov 

          



     r HCl 1 GM r HCl 1 GM 3-13                     t}        ir HCl 1          

 



               00:00:                               GM             



               00                                                

 

     valACYclovi valACYclovi 2020-0      No             1{table TID  valACYclov 

          



     r HCl 1 GM r HCl 1 GM 3-13                     t}        ir HCl 1          

 



               00:00:                               GM             



               00                                                

 

     Valacyclovi Valacyclovi -0      No             1{table TID  Valacyclov 

          



     r HCl 1 GM r HCl 1 GM 3-13                     t}        ir HCl 1          

 



               00:00:                               GM             



               00                                                

 

     Valacyclovi Valacyclovi -0      No             1{table TID  Valacyclov 

          Common



     r HCl 1 GM r HCl 1 GM 3-13                     t}        ir HCl 1          

 [facility]



               00:00:                               GM             - [facility]



               00                                                [facility]

 

     dextroamphe      2016      Yes            10mg      Take 10 mg           

[facility]



     tamine-amph      1-01                               by mouth           [facility] 

of



     etamine      00:54:                               every           [place]



     (ADDERALL)      58                                 morning.           [facility]

al



     10 mg                                                        [facility]



     tablet                                                        

 

     PRENATAL      2016      Yes                      Take by           [facility]



     VIT37/IRON/                                     mouth.           ity of



     FOLIC ACID      00:54:                                              [place]



     (PRENATA      58                                                Medical



     ORAL)                                                        [place]

 

     dextroamphe      2016      Yes            10mg      Take 10 mg           

[facility]



     tamine-amph      1-01                               by mouth           [facility] 

of



     etamine      00:54:                               every           [place]



     (ADDERALL)      58                                 morning.           [facility]

al



     10 mg                                                        [facility]



     tablet                                                        

 

     PRENATAL      2016      Yes                      Take by           [facility]



     VIT37/IRON/                                     mouth.           ity of



     FOLIC ACID      00:54:                                              [place]



     (PRENATA      58                                                Medical



     ORAL)                                                        [place]

 

     dextroamphe      2016      Yes            10mg      Take 10 mg           

[facility]



     tamine-amph      1-01                               by mouth           [facility] 

of



     etamine      00:54:                               every           [place]



     (ADDERALL)      58                                 morning.           [facility]

al



     10 mg                                                        [facility]



     tablet                                                        

 

     PRENATAL      2016      Yes                      Take by           [facility]



     VIT37/IRON/                                     mouth.           ity of



     FOLIC ACID      00:54:                                              [place]



     (PRENATA      58                                                Medical



     ORAL)                                                        [place]

 

     dextroamphe      2016      Yes            10mg      Take 10 mg           

[facility]



     tamine-amph      1-01                               by mouth           [facility] 

of



     etamine      00:54:                               every           [place]



     (ADDERALL)      58                                 morning.           [facility]

al



     10 mg                                                        [facility]



     tablet                                                        

 

     PRENATAL      2016      Yes                      Take by           [facility]



     VIT37/IRON/                                     mouth.           ity of



     FOLIC ACID      00:54:                                              [place]



     (PRENATA      58                                                Medical



     ORAL)                                                        [place]

 

     dextroamphe      2016      Yes            10mg      Take 10 mg           

[facility]



     tamine-amph      1-01                               by mouth           ity 

of



     etamine      00:54:                               every           [place]



     (ADDERALL)      58                                 morning.           Medic

al



     10 mg                                                        [facility]



     tablet                                                        

 

     PRENATAL      2016      Yes                      Take by           [facility]

s



     VIT37/IRON/                                     mouth.           ity of



     FOLIC ACID      00:54:                                              [place]



     (PRENATA      58                                                Medical



     ORAL)                                                        Branch

 

     dextroamphe      2016      Yes            10mg      Take 10 mg           

[facility]



     tamine-amph                                     by mouth           ity 

of



     etamine      00:54:                               every           [place]



     (ADDERALL)      58                                 morning.           Medic

al



     10 mg                                                        Branch



     tablet                                                        

 

     PRENATAL      2016      Yes                      Take by           [facility]

s



     VIT37/IRON/                                     mouth.           ity of



     FOLIC ACID      00:54:                                              [place]



     (PRENATA      58                                                Medical



     ORAL)                                                        Branch

 

     dextroamphe      2016      Yes            10mg      Take 10 mg           

[facility]



     tamine-amph                                     by mouth           ity 

of



     etamine      00:54:                               every           [place]



     (ADDERALL)      58                                 morning.           Medic

al



     10 mg                                                        Branch



     tablet                                                        

 

     PRENATAL      2016      Yes                      Take by           [facility]

s



     VIT37/IRON/                                     mouth.           ity of



     FOLIC ACID      00:54:                                              [place]



     (PRENATA      58                                                Medical



     ORAL)                                                        Branch

 

     ondansetron      2016      Yes            4mg       Take 1           Univ

ers



     (ZOFRAN, AS      0-31                               tablet by           ity

 of



     HYDROCHLORI      00:00:                               mouth           [place]



     DE,) 4 mg      00                                 every 8           Medical



     tablet                                         (eight)           Branch



                                                  hours.           

 

     ondansetron      2016      Yes            4mg       Take 1           Univ

ers



     (ZOFRAN, AS      0-31                               tablet by           ity

 of



     HYDROCHLORI      00:00:                               mouth           [place]



     DE,) 4 mg      00                                 every 8           Medical



     tablet                                         (eight)           Branch



                                                  hours.           

 

     ondansetron      2016      Yes            4mg       Take 1           Univ

ers



     (ZOFRAN, AS      0-31                               tablet by           ity

 of



     HYDROCHLORI      00:00:                               mouth           [place]



     DE,) 4 mg      00                                 every 8           Medical



     tablet                                         (eight)           Branch



                                                  hours.           

 

     ondansetron      2016      Yes            4mg       Take 1           Univ

ers



     (ZOFRAN, AS      0-31                               tablet by           ity

 of



     HYDROCHLORI      00:00:                               mouth           [place]



     DE,) 4 mg      00                                 every 8           Medical



     tablet                                         (eight)           Branch



                                                  hours.           

 

     ondansetron      2016      Yes            4mg       Take 1           Univ

ers



     (ZOFRAN, AS      0-31                               tablet by           ity

 of



     HYDROCHLORI      00:00:                               mouth           [place]



     DE,) 4 mg      00                                 every 8           Medical



     tablet                                         (eight)           Branch



                                                  hours.           

 

     ondansetron      2016      Yes            4mg       Take 1           Univ

ers



     (ZOFRAN, AS      0-31                               tablet by           ity

 of



     HYDROCHLORI      00:00:                               mouth           [place]



     DE,) 4 mg      00                                 every 8           Medical



     tablet                                         (eight)           Branch



                                                  hours.           

 

     ondansetron      2016      Yes            4mg       Take 1           Univ

ers



     (ZOFRAN, AS      0-31                               tablet by           ity

 of



     HYDROCHLORI      00:00:                               mouth           [place]



     DE,) 4 mg      00                                 every 8           Medical



     tablet                                         (eight)           Branch



                                                  hours.           

 

     Levothyroxi Levothyroxi           Yes  [place]                1 tablet      

     Common



     ne Sodium ne Sodium                [place]                on an           Spi

rit



                                                  empty           - [facility]



                                                  stomach in           [facility]

 

     Propranolol Propranolol           Yes  [place]                1 tablet      

     Common



     HCl  HCl                 [place]                on an           [facility]



                                                  empty           - [facility]



                                                  stomach           [facility]

 

     Duexis Duexis           Yes  [place]                1 tablet           Commo

n



                              [place]                               [facility]

 

     Belsomra Belsomra           Yes  [place]                1 tablet           C

ommon



                              [place]                at bedtime           [facility]



                                                  as needed           [facility]

 

     Zantac Zantac           Yes  [place]                1 tablet           Commo

n



                              [place]                at bedtime           [facility]

 

     Cetirizine Cetirizine           Yes  [place]                1 tablet        

   Common



     HCl  HCl                 [place]                               [facility]

 

     Escitalopra Escitalopra           Yes  [place]                1 tablet      

     Common



     m Oxalate m Oxalate                [place]                               S[facility]

it



                                                                 [facility]

 

     Levothyroxi Levothyroxi           No                  QD   Levothyrox      

     Common



     ne Sodium ne Sodium                                    ine Sodium          

 [facility]



     25 MCG 25 MCG                                    25 MCG           [facility]

 

     HydrOXYzine HydrOXYzine           No                       HydrOXYzin      

     Common



     Pamoate 25 Pamoate 25                                    e Pamoate         

  [facility]



     MG   MG                                      25 MG           [facility]

 

     Cetirizine Cetirizine           No             1{table QD   Cetirizine     

      Common



     HCl 10 MG HCl 10 MG                          t}        HCl 10 MG           

[facility]

 

     Zantac 150 Zantac 150           No             1{table QD   Zantac 150     

      Common



     MG   MG                            t_at_be      MG             [facility]



                                        dtime}                     [facility]

 

     Propranolol Propranolol           No             1{table BID  Propranolo   

        Common



     HCl 20 MG HCl 20 MG                          t_on_an      l HCl 20         

  [facility]



                                        _empty_      MG             - [facility]



                                        stomach                     [facility]

 

     Duexis Duexis           No             1{table      Duexis           Common



     800-26.6 -26.6 MG                          t}        800-26.6        

   [facility]



                                                  MG             [facility]

 

     Escitalopra Escitalopra           No             1{table QD   Escitalopr   

        Common



     m Oxalate m Oxalate                          t}        am Oxalate          

 [facility]



     20 MG 20 MG                                    20 MG           [facility]

 

     Levothyroxi Levothyroxi           No                  QD   Levothyrox      

     Common



     ne Sodium ne Sodium                                    ine Sodium          

 [facility]



     25 MCG 25 MCG                                    25 MCG           [facility]

 

     HydrOXYzine HydrOXYzine           No                       HydrOXYzin      

     Common



     Pamoate 25 Pamoate 25                                    e Pamoate         

  [facility]



     MG   MG                                      25 MG           [facility]

 

     Cetirizine Cetirizine           No             1{table QD   Cetirizine     

      Common



     HCl 10 MG HCl 10 MG                          t}        HCl 10 MG           

[facility]



                                                                 [facility]

 

     Zantac 150 Zantac 150           No             1{table QD   Zantac 150     

      Common



     MG   MG                            t_at_be      MG             [facility]



                                        dtime}                     [facility]

 

     Propranolol Propranolol           No             1{table BID  Propranolo   

        Common



     HCl 20 MG HCl 20 MG                          t_on_an      l HCl 20         

  [facility]



                                        _empty_      MG             [facility]



                                        stomach                     [facility]

 

     Duexis Duexis           No             1{table      Duexis           Common



     800-26.6 -26.6 MG                          t}        800-26.6        

   [facility]



                                                  MG             [facility]

 

     Escitalopra Escitalopra           No             1{table QD   Escitalopr   

        Common



     m Oxalate m Oxalate                          t}        am Oxalate          

 [facility]



     20 MG 20 MG                                    20 MG           [facility]

 

     HydrOXYzine HydrOXYzine           No                       HydrOXYzin      

     



     Pamoate 25 Pamoate 25                                    e Pamoate         

  



     MG   MG                                      25 MG           

 

     Propranolol Propranolol           No             1{table BID  Propranolo   

        



     HCl 20 MG HCl 20 MG                          t_on_an      l HCl 20         

  



                                        _empty_      MG             



                                        stomach                     



                                        }                        

 

     Escitalopra Escitalopra           No             1{table QD   Escitalopr   

        



     m Oxalate m Oxalate                          t}        am Oxalate          

 



     20 MG 20 MG                                    20 MG           

 

     Levothyroxi Levothyroxi           No                  QD   Levothyrox      

     



     ne Sodium ne Sodium                                    ine Sodium          

 



     25 MCG 25 MCG                                    25 MCG           

 

     Cetirizine Cetirizine           No             1{table QD   Cetirizine     

      



     HCl 10 MG HCl 10 MG                          t}        HCl 10 MG           

 

     Zantac 150 Zantac 150           No             1{table QD   Zantac 150     

      



     MG   MG                            t_at_be      MG             



                                        dtime}                     

 

     Duexis Duexis           No             1{table      Duexis           



     800-26.6 -26.6 MG                          t}        800-26.6        

   



                                                  MG             

 

     HydrOXYzine HydrOXYzine           No                       HydrOXYzin      

     



     Pamoate 25 Pamoate 25                                    e Pamoate         

  



     MG   MG                                      25 MG           

 

     Propranolol Propranolol           No             1{table BID  Propranolo   

        



     HCl 20 MG HCl 20 MG                          t_on_an      l HCl 20         

  



                                        _empty_      MG             



                                        stomach                     



                                        }                        

 

     Escitalopra Escitalopra           No             1{table QD   Escitalopr   

        



     m Oxalate m Oxalate                          t}        am Oxalate          

 



     20 MG 20 MG                                    20 MG           

 

     Levothyroxi Levothyroxi           No                  QD   Levothyrox      

     



     ne Sodium ne Sodium                                    ine Sodium          

 



     25 MCG 25 MCG                                    25 MCG           

 

     Cetirizine Cetirizine           No             1{table QD   Cetirizine     

      



     HCl 10 MG HCl 10 MG                          t}        HCl 10 MG           

 

     Zantac 150 Zantac 150           No             1{table QD   Zantac 150     

      



     MG   MG                            t_at_be      MG             



                                        dtime}                     

 

     Duexis Duexis           No             1{table      Duexis           



     800-26.6 -26.6 MG                          t}        800-26.6        

   



                                                  MG             

 

     Levothyroxi Levothyroxi           No                  QD   Levothyrox      

     



     ne Sodium ne Sodium                                    ine Sodium          

 



     25 MCG 25 MCG                                    25 MCG           

 

     Duexis Duexis           No             1{table      Duexis           



     800-26.6 -26.6 MG                          t}        800-26.6        

   



                                                  MG             

 

     Cetirizine Cetirizine           No             1{table QD   Cetirizine     

      



     HCl 10 MG HCl 10 MG                          t}        HCl 10 MG           

 

     Zantac 150 Zantac 150           No             1{table QD   Zantac 150     

      



     MG   MG                            t_at_be      MG             



                                        dtime}                     

 

     Propranolol Propranolol           No             1{table BID  Propranolo   

        



     HCl 20 MG HCl 20 MG                          t_on_an      l HCl 20         

  



                                        _empty_      MG             



                                        stomach                     



                                        }                        

 

     HydrOXYzine HydrOXYzine           No                       HydrOXYzin      

     



     Pamoate 25 Pamoate 25                                    e Pamoate         

  



     MG   MG                                      25 MG           

 

     Escitalopra Escitalopra           No             1{table QD   Escitalopr   

        



     m Oxalate m Oxalate                          t}        am Oxalate          

 



     20 MG 20 MG                                    20 MG           

 

     HydrOXYzine HydrOXYzine           No                       HydrOXYzin      

     



     Pamoate 25 Pamoate 25                                    e Pamoate         

  



     MG   MG                                      25 MG           

 

     Levothyroxi Levothyroxi           No                  QD   Levothyrox      

     



     ne Sodium ne Sodium                                    ine Sodium          

 



     25 MCG 25 MCG                                    25 MCG           

 

     Cetirizine Cetirizine           No             1{table QD   Cetirizine     

      



     HCl 10 MG HCl 10 MG                          t}        HCl 10 MG           

 

     Zantac 150 Zantac 150           No             1{table QD   Zantac 150     

      



     MG   MG                            t_at_be      MG             



                                        dtime}                     

 

     Propranolol Propranolol           No             1{table BID  Propranolo   

        



     HCl 20 MG HCl 20 MG                          t_on_an      l HCl 20         

  



                                        _empty_      MG             



                                        stomach                     



                                        }                        

 

     Duexis Duexis           No             1{table      Duexis           



     800-26.6 -26.6 MG                          t}        800-26.6        

   



                                                  MG             

 

     Escitalopra Escitalopra           No             1{table QD   Escitalopr   

        



     m Oxalate m Oxalate                          t}        am Oxalate          

 



     20 MG 20 MG                                    20 MG           

 

     HydrOXYzine HydrOXYzine           No                       HydrOXYzin      

     



     Pamoate 25 Pamoate 25                                    e Pamoate         

  



     MG   MG                                      25 MG           

 

     Levothyroxi Levothyroxi           No                  QD   Levothyrox      

     



     ne Sodium ne Sodium                                    ine Sodium          

 



     25 MCG 25 MCG                                    25 MCG           

 

     Cetirizine Cetirizine           No             1{table QD   Cetirizine     

      



     HCl 10 MG HCl 10 MG                          t}        HCl 10 MG           

 

     Zantac 150 Zantac 150           No             1{table QD   Zantac 150     

      



     MG   MG                            t_at_be      MG             



                                        dtime}                     

 

     Propranolol Propranolol           No             1{table BID  Propranolo   

        



     HCl 20 MG HCl 20 MG                          t_on_an      l HCl 20         

  



                                        _empty_      MG             



                                        stomach                     



                                        }                        

 

     Duexis Duexis           No             1{table      Duexis           



     800-26.6 -26.6 MG                          t}        800-26.6        

   



                                                  MG             

 

     Escitalopra Escitalopra           No             1{table QD   Escitalopr   

        



     m Oxalate m Oxalate                          t}        am Oxalate          

 



     20 MG 20 MG                                    20 MG           

 

     HydrOXYzine HydrOXYzine           No                       HydrOXYzin      

     



     Pamoate 25 Pamoate 25                                    e Pamoate         

  



     MG   MG                                      25 MG           

 

     Escitalopra Escitalopra           No             1{table QD   Escitalopr   

        



     m Oxalate m Oxalate                          t}        am Oxalate          

 



     20 MG 20 MG                                    20 MG           

 

     Levothyroxi Levothyroxi           No                  QD   Levothyrox      

     



     ne Sodium ne Sodium                                    ine Sodium          

 



     25 MCG 25 MCG                                    25 MCG           

 

     Duexis Duexis           No             1{table      Duexis           



     800-26.6 -26.6 MG                          t}        800-26.6        

   



                                                  MG             

 

     Zantac 150 Zantac 150           No             1{table QD   Zantac 150     

      



     MG   MG                            t_at_be      MG             



                                        dtime}                     

 

     Propranolol Propranolol           No             1{table BID  Propranolo   

        



     HCl 20 MG HCl 20 MG                          t_on_an      l HCl 20         

  



                                        _empty_      MG             



                                        stomach                     



                                        }                        

 

     Cetirizine Cetirizine           No             1{table QD   Cetirizine     

      



     HCl 10 MG HCl 10 MG                          t}        HCl 10 MG           

 

     HydrOXYzine HydrOXYzine           No                       HydrOXYzin      

     



     Pamoate 25 Pamoate 25                                    e Pamoate         

  



     MG   MG                                      25 MG           

 

     Escitalopra Escitalopra           No             1{table QD   Escitalopr   

        



     m Oxalate m Oxalate                          t}        am Oxalate          

 



     20 MG 20 MG                                    20 MG           

 

     Levothyroxi Levothyroxi           No                  QD   Levothyrox      

     



     ne Sodium ne Sodium                                    ine Sodium          

 



     25 MCG 25 MCG                                    25 MCG           

 

     Duexis Duexis           No             1{table      Duexis           



     800-26.6 -26.6 MG                          t}        800-26.6        

   



                                                  MG             

 

     Zantac 150 Zantac 150           No             1{table QD   Zantac 150     

      



     MG   MG                            t_at_be      MG             



                                        dtime}                     

 

     Propranolol Propranolol           No             1{table BID  Propranolo   

        



     HCl 20 MG HCl 20 MG                          t_on_an      l HCl 20         

  



                                        _empty_      MG             



                                        stomach                     



                                        }                        

 

     Cetirizine Cetirizine           No             1{table QD   Cetirizine     

      



     HCl 10 MG HCl 10 MG                          t}        HCl 10 MG           

 

     Duexis Duexis           No             1{table      Duexis           



     800-26.6 -26.6 MG                          t}        800-26.6        

   



                                                  MG             

 

     Escitalopra Escitalopra           No             1{table QD   Escitalopr   

        



     m Oxalate m Oxalate                          t}        am Oxalate          

 



     20 MG 20 MG                                    20 MG           

 

     Levothyroxi Levothyroxi           No                  QD   Levothyrox      

     



     ne Sodium ne Sodium                                    ine Sodium          

 



     25 MCG 25 MCG                                    25 MCG           

 

     HydrOXYzine HydrOXYzine           No                       HydrOXYzin      

     



     Pamoate 25 Pamoate 25                                    e Pamoate         

  



     MG   MG                                      25 MG           

 

     Cetirizine Cetirizine           No             1{table QD   Cetirizine     

      



     HCl 10 MG HCl 10 MG                          t}        HCl 10 MG           

 

     Zantac 150 Zantac 150           No             1{table QD   Zantac 150     

      



     MG   MG                            t_at_be      MG             



                                        dtime}                     

 

     Propranolol Propranolol           No             1{table BID  Propranolo   

        



     HCl 20 MG HCl 20 MG                          t_on_an      l HCl 20         

  



                                        _empty_      MG             



                                        stomach                     



                                        }                        

 

     Zantac 150 Zantac 150           No             1{table QD   Zantac 150     

      



     MG   MG                            t_at_be      MG             



                                        dtime}                     

 

     Duexis Duexis           No             1{table      Duexis           



     800-26.6 -26.6 MG                          t}        800-26.6        

   



                                                  MG             

 

     Propranolol Propranolol           No             1{table BID  Propranolo   

        



     HCl 20 MG HCl 20 MG                          t_on_an      l HCl 20         

  



                                        _empty_      MG             



                                        stomach                     



                                        }                        

 

     Escitalopra Escitalopra           No             1{table QD   Escitalopr   

        



     m Oxalate m Oxalate                          t}        am Oxalate          

 



     20 MG 20 MG                                    20 MG           

 

     HydrOXYzine HydrOXYzine           No                       HydrOXYzin      

     



     Pamoate 25 Pamoate 25                                    e Pamoate         

  



     MG   MG                                      25 MG           

 

     Cetirizine Cetirizine           No             1{table QD   Cetirizine     

      



     HCl 10 MG HCl 10 MG                          t}        HCl 10 MG           

 

     Levothyroxi Levothyroxi           No                       Levothyrox      

     



     ne Sodium ne Sodium                                    ine Sodium          

 



     25 MCG 25 MCG                                    25 MCG           

 

     Escitalopra Escitalopra           No             1{table QD   Escitalopr   

        



     m Oxalate m Oxalate                          t}        am Oxalate          

 



     20 MG 20 MG                                    20 MG           

 

     Cetirizine Cetirizine           No             1{table QD   Cetirizine     

      



     HCl 10 MG HCl 10 MG                          t}        HCl 10 MG           

 

     Propranolol Propranolol           No             1{table BID  Propranolo   

        



     HCl 20 MG HCl 20 MG                          t_on_an      l HCl 20         

  



                                        _empty_      MG             



                                        stomach                     



                                        }                        

 

     Duexis Duexis           No             1{table      Duexis           



     800-26.6 -26.6 MG                          t}        800-26.6        

   



                                                  MG             

 

     Levothyroxi Levothyroxi           No                       Levothyrox      

     



     ne Sodium ne Sodium                                    ine Sodium          

 



     25 MCG 25 MCG                                    25 MCG           

 

     HydrOXYzine HydrOXYzine           No                       HydrOXYzin      

     



     Pamoate 25 Pamoate 25                                    e Pamoate         

  



     MG   MG                                      25 MG           

 

     Zantac 150 Zantac 150           No             1{table QD   Zantac 150     

      



     MG   MG                            t_at_be      MG             



                                        dtime}                     

 

     Escitalopra Escitalopra           No             1{table QD   Escitalopr   

        



     m Oxalate m Oxalate                          t}        am Oxalate          

 



     20 MG 20 MG                                    20 MG           

 

     Cetirizine Cetirizine           No             1{table QD   Cetirizine     

      



     HCl 10 MG HCl 10 MG                          t}        HCl 10 MG           

 

     Propranolol Propranolol           No             1{table BID  Propranolo   

        



     HCl 20 MG HCl 20 MG                          t_on_an      l HCl 20         

  



                                        _empty_      MG             



                                        stomach                     



                                        }                        

 

     Duexis Duexis           No             1{table      Duexis           



     800-26.6 -26.6 MG                          t}        800-26.6        

   



                                                  MG             

 

     Zantac 150 Zantac 150           No             1{table QD   Zantac 150     

      



     MG   MG                            t_at_be      MG             



                                        dtime}                     

 

     Levothyroxi Levothyroxi           No                       Levothyrox      

     



     ne Sodium ne Sodium                                    ine Sodium          

 



     25 MCG 25 MCG                                    25 MCG           

 

     HydrOXYzine HydrOXYzine           No                       HydrOXYzin      

     



     Pamoate 25 Pamoate 25                                    e Pamoate         

  



     MG   MG                                      25 MG           

 

     Cetirizine Cetirizine           No             1{table QD   Cetirizine     

      



     HCl 10 MG HCl 10 MG                          t}        HCl 10 MG           

 

     Propranolol Propranolol           No             1{table BID  Propranolo   

        



     HCl 20 MG HCl 20 MG                          t_on_an      l HCl 20         

  



                                        _empty_      MG             



                                        stomach                     



                                        }                        

 

     Duexis Duexis           No             1{table      Duexis           



     800-26.6 -26.6 MG                          t}        800-26.6        

   



                                                  MG             

 

     hydrOXYzine hydrOXYzine           No                       hydrOXYzin      

     



     Pamoate 25 Pamoate 25                                    e Pamoate         

  



     MG   MG                                      25 MG           

 

     Zantac 150 Zantac 150           No             1{table QD   Zantac 150     

      



     MG   MG                            t_at_be      MG             



                                        dtime}                     

 

     Escitalopra Escitalopra           No             1{table QD   Escitalopr   

        



     m Oxalate m Oxalate                          t}        am Oxalate          

 



     20 MG 20 MG                                    20 MG           

 

     Levothyroxi Levothyroxi           No                       Levothyrox      

     



     ne Sodium ne Sodium                                    ine Sodium          

 



     25 MCG 25 MCG                                    25 MCG           

 

     hydrOXYzine hydrOXYzine           No                       hydrOXYzin      

     



     Pamoate 25 Pamoate 25                                    e Pamoate         

  



     MG   MG                                      25 MG           

 

     hydrOXYzine hydrOXYzine           No             1{capsu TID  hydrOXYzin   

        



     Pamoate 25 Pamoate 25                          le_as_n      e Pamoate      

     



     MG   MG                            eeded}      25 MG           

 

     Zantac 150 Zantac 150           No             1{table QD   Zantac 150     

      



     MG   MG                            t_at_be      MG             



                                        dtime}                     

 

     Levothyroxi Levothyroxi           No                  QD   Levothyrox      

     



     ne Sodium ne Sodium                                    ine Sodium          

 



     25 MCG 25 MCG                                    25 MCG           

 

     Cetirizine Cetirizine           No             1{table QD   Cetirizine     

      



     HCl 10 MG HCl 10 MG                          t}        HCl 10 MG           

 

     Escitalopra Escitalopra           No             1{table QD   Escitalopr   

        



     m Oxalate m Oxalate                          t}        am Oxalate          

 



     20 MG 20 MG                                    20 MG           

 

     Propranolol Propranolol           No             1{table BID  Propranolo   

        



     HCl 20 MG HCl 20 MG                          t_on_an      l HCl 20         

  



                                        _empty_      MG             



                                        stomach                     



                                        }                        

 

     Duexis Duexis           No             1{table      Duexis           



     800-26.6 -26.6 MG                          t}        800-26.6        

   



                                                  MG             

 

     Cetirizine Cetirizine           No             1{table QD   Cetirizine     

      



     HCl 10 MG HCl 10 MG                          t}        HCl 10 MG           

 

     Azelastine- Azelastine-           No             1{spray BID  Azelastine   

        



     Fluticasone Fluticasone                          _in_eac      -Fluticaso   

        



     137-50 137-50                          h_nostr      ne 137-50           



     MCG/ACT MCG/ACT                          il}       MCG/ACT           

 

     Montelukast Montelukast           No             1{table QD   Montelukas   

        



     Sodium 10 Sodium 10                          t}        t Sodium           



     MG   MG                                      10 MG           

 

     predniSONE predniSONE           No                  QD   predniSONE        

   



     10 MG 10 MG                                    10 MG           

 

     Azithromyci Azithromyci           No                  QD   Azithromyc      

     



     n 250 MG n 250 MG                                    in 250 MG           

 

     Propranolol Propranolol           No             1{table BID  Propranolo   

        



     HCl 20 MG HCl 20 MG                          t_on_an      l HCl 20         

  



                                        _empty_      MG             



                                        stomach                     



                                        }                        

 

     Duexis Duexis           No             1{table      Duexis           



     800-26.6 -26.6 MG                          t}        800-26.6        

   



                                                  MG             

 

     Levothyroxi Levothyroxi           No                  QD   Levothyrox      

     



     ne Sodium ne Sodium                                    ine Sodium          

 



     25 MCG 25 MCG                                    25 MCG           

 

     hydrOXYzine hydrOXYzine           No                       hydrOXYzin      

     



     Pamoate 25 Pamoate 25                                    e Pamoate         

  



     MG   MG                                      25 MG           

 

     Benzonatate Benzonatate           No             1{capsu TID  Benzonatat   

        



     200  MG                          le}       e 200 MG           

 

     Zantac 150 Zantac 150           No             1{table QD   Zantac 150     

      



     MG   MG                            t_at_be      MG             



                                        dtime}                     

 

     Escitalopra Escitalopra           No                       Escitalopr      

     



     m Oxalate m Oxalate                                    am Oxalate          

 



     20 MG 20 MG                                    20 MG           

 

     Cetirizine Cetirizine           No             1{table QD   Cetirizine     

      



     HCl 10 MG HCl 10 MG                          t}        HCl 10 MG           

 

     Azelastine- Azelastine-           No             1{spray BID  Azelastine   

        



     Fluticasone Fluticasone                          _in_eac      -Fluticaso   

        



     137-50 137-50                          h_nostr      ne 137-50           



     MCG/ACT MCG/ACT                          il}       MCG/ACT           

 

     Montelukast Montelukast           No             1{table QD   Montelukas   

        



     Sodium 10 Sodium 10                          t}        t Sodium           



     MG   MG                                      10 MG           

 

     predniSONE predniSONE           No                  QD   predniSONE        

   



     10 MG 10 MG                                    10 MG           

 

     Azithromyci Azithromyci           No                  QD   Azithromyc      

     



     n 250 MG n 250 MG                                    in 250 MG           

 

     Propranolol Propranolol           No             1{table BID  Propranolo   

        



     HCl 20 MG HCl 20 MG                          t_on_an      l HCl 20         

  



                                        _empty_      MG             



                                        stomach                     



                                        }                        

 

     Duexis Duexis           No             1{table      Duexis           



     800-26.6 -26.6 MG                          t}        800-26.6        

   



                                                  MG             

 

     Levothyroxi Levothyroxi           No                  QD   Levothyrox      

     



     ne Sodium ne Sodium                                    ine Sodium          

 



     25 MCG 25 MCG                                    25 MCG           

 

     hydrOXYzine hydrOXYzine           No                       hydrOXYzin      

     



     Pamoate 25 Pamoate 25                                    e Pamoate         

  



     MG   MG                                      25 MG           

 

     Benzonatate Benzonatate           No             1{capsu TID  Benzonatat   

        



     200  MG                          le}       e 200 MG           

 

     Zantac 150 Zantac 150           No             1{table QD   Zantac 150     

      



     MG   MG                            t_at_be      MG             



                                        dtime}                     

 

     Escitalopra Escitalopra           No                       Escitalopr      

     



     m Oxalate m Oxalate                                    am Oxalate          

 



     20 MG 20 MG                                    20 MG           

 

     hydrOXYzine hydrOXYzine           No                       hydrOXYzin      

     



     Pamoate 25 Pamoate 25                                    e Pamoate         

  



     MG   MG                                      25 MG           

 

     Montelukast Montelukast           No             1{table QD   Montelukas   

        



     Sodium 10 Sodium 10                          t}        t Sodium           



     MG   MG                                      10 MG           

 

     predniSONE predniSONE           No                  QD   predniSONE        

   



     10 MG 10 MG                                    10 MG           

 

     Azithromyci Azithromyci           No                  QD   Azithromyc      

     



     n 250 MG n 250 MG                                    in 250 MG           

 

     Propranolol Propranolol           No             1{table BID  Propranolo   

        



     HCl 20 MG HCl 20 MG                          t_on_an      l HCl 20         

  



                                        _empty_      MG             



                                        stomach                     



                                        }                        

 

     Cetirizine Cetirizine           No             1{table QD   Cetirizine     

      



     HCl 10 MG HCl 10 MG                          t}        HCl 10 MG           

 

     Azelastine- Azelastine-           No             1{spray BID  Azelastine   

        



     Fluticasone Fluticasone                          _in_eac      -Fluticaso   

        



     137-50 137-50                          h_nostr      ne 137-50           



     MCG/ACT MCG/ACT                          il}       MCG/ACT           

 

     Levothyroxi Levothyroxi           No                  QD   Levothyrox      

     



     ne Sodium ne Sodium                                    ine Sodium          

 



     25 MCG 25 MCG                                    25 MCG           

 

     Zantac 150 Zantac 150           No             1{table QD   Zantac 150     

      



     MG   MG                            t_at_be      MG             



                                        dtime}                     

 

     Benzonatate Benzonatate           No             1{capsu TID  Benzonatat   

        



     200  MG                          le}       e 200 MG           

 

     Duexis Duexis           No             1{table      Duexis           



     800-26.6 -26.6 MG                          t}        800-26.6        

   



                                                  MG             

 

     Escitalopra Escitalopra           No                       Escitalopr      

     



     m Oxalate m Oxalate                                    am Oxalate          

 



     20 MG 20 MG                                    20 MG           

 

     Levothyroxi Levothyroxi           No                  QD   Levothyrox      

     



     ne Sodium ne Sodium                                    ine Sodium          

 



     25 MCG 25 MCG                                    25 MCG           

 

     Azithromyci Azithromyci           No                  QD   Azithromyc      

     



     n 250 MG n 250 MG                                    in 250 MG           

 

     Propranolol Propranolol           No             1{table BID  Propranolo   

        



     HCl 20 MG HCl 20 MG                          t_on_an      l HCl 20         

  



                                        _empty_      MG             



                                        stomach                     



                                        }                        

 

     predniSONE predniSONE           No                  QD   predniSONE        

   



     10 MG 10 MG                                    10 MG           

 

     hydrOXYzine hydrOXYzine           No                       hydrOXYzin      

     



     Pamoate 25 Pamoate 25                                    e Pamoate         

  



     MG   MG                                      25 MG           

 

     Cetirizine Cetirizine           No             1{table QD   Cetirizine     

      



     HCl 10 MG HCl 10 MG                          t}        HCl 10 MG           

 

     Azelastine- Azelastine-           No             1{spray BID  Azelastine   

        



     Fluticasone Fluticasone                          _in_eac      -Fluticaso   

        



     137-50 137-50                          h_nostr      ne 137-50           



     MCG/ACT MCG/ACT                          il}       MCG/ACT           

 

     Montelukast Montelukast           No             1{table QD   Montelukas   

        



     Sodium 10 Sodium 10                          t}        t Sodium           



     MG   MG                                      10 MG           

 

     Escitalopra Escitalopra           No                       Escitalopr      

     



     m Oxalate m Oxalate                                    am Oxalate          

 



     20 MG 20 MG                                    20 MG           

 

     hydrOXYzine hydrOXYzine           No             1{capsu TID  hydrOXYzin   

        



     Pamoate 25 Pamoate 25                          le_as_n      e Pamoate      

     



     MG   MG                            eeded}      25 MG           

 

     Duexis Duexis           No             1{table      Duexis           



     800-26.6 -26.6 MG                          t}        800-26.6        

   



                                                  MG             

 

     Benzonatate Benzonatate           No             1{capsu TID  Benzonatat   

        



     200  MG                          le}       e 200 MG           

 

     Escitalopra Escitalopra           No             1{table QD   Escitalopr   

        



     m Oxalate m Oxalate                          t}        am Oxalate          

 



     20 MG 20 MG                                    20 MG           

 

     Zantac 150 Zantac 150           No             1{table QD   Zantac 150     

      



     MG   MG                            t_at_be      MG             



                                        dtime}                     

 

     Zantac 150 Zantac 150           No             1{table QD   Zantac 150     

      



     MG   MG                            t_at_be      MG             



                                        dtime}                     

 

     Levothyroxi Levothyroxi           No                  QD   Levothyrox      

     



     ne Sodium ne Sodium                                    ine Sodium          

 



     25 MCG 25 MCG                                    25 MCG           

 

     Duexis Duexis           No             1{table      Duexis           



     800-26.6 -26.6 MG                          t}        800-26.6        

   



                                                  MG             

 

     Propranolol Propranolol           No             1{table BID  Propranolo   

        



     HCl 20 MG HCl 20 MG                          t_on_an      l HCl 20         

  



                                        _empty_      MG             



                                        stomach                     



                                        }                        

 

     HydrOXYzine HydrOXYzine           No             1{capsu TID  HydrOXYzin   

        



     Pamoate 25 Pamoate 25                          le_as_n      e Pamoate      

     



     MG   MG                            eeded}      25 MG           

 

     Cetirizine Cetirizine           No             1{table QD   Cetirizine     

      



     HCl 10 MG HCl 10 MG                          t}        HCl 10 MG           

 

     HydrOXYzine HydrOXYzine           No                       HydrOXYzin      

     



     Pamoate 25 Pamoate 25                                    e Pamoate         

  



     MG   MG                                      25 MG           

 

     Escitalopra Escitalopra           No             1{table QD   Escitalopr   

        



     m Oxalate m Oxalate                          t}        am Oxalate          

 



     20 MG 20 MG                                    20 MG           

 

     Levothyroxi Levothyroxi           No                  QD   Levothyrox      

     [facility]



     ne Sodium ne Sodium                                    ine Sodium          

 [facility]



     25 MCG 25 MCG                                    25 MCG           [facility]

 

     HydrOXYzine HydrOXYzine           No                       HydrOXYzin      

     [facility]



     Pamoate 25 Pamoate 25                                    e Pamoate         

  [facility]



     MG   MG                                      25 MG           [facility]

 

     Cetirizine Cetirizine           No             1{table QD   Cetirizine     

      [facility]



     HCl 10 MG HCl 10 MG                          t}        HCl 10 MG           

[facility]

 

     Zantac 150 Zantac 150           No             1{table QD   Zantac 150     

      [facility]



     MG   MG                            t_at_be      MG             [facility]



                                        dtime}                     [facility]

 

     Propranolol Propranolol           No             1{table BID  Propranolo   

        [facility]



     HCl 20 MG HCl 20 MG                          t_on_an      l HCl 20         

  [facility]



                                        _empty_      MG             - [facility]



                                        stomach                     [facility]

 

     Duexis Duexis           No             1{table      Duexis           [facility]



     800-26.6 -26.6 MG                          t}        800-26.6        

   [facility]



                                                  MG             [facility]

 

     Escitalopra Escitalopra           No             1{table QD   Escitalopr   

        [facility]



     m Oxalate m Oxalate                          t}        am Oxalate          

 [facility]



     20 MG 20 MG                                    20 MG           [facility]







Immunizations







           Ordered Immunization Filled Immunization Date       Status     Commen

ts   Source



           Name       Name                                        

 

           Kenalog    Kenalog    2020 Completed             [facility]



           (Triamcinolone) (Triamcinolone) 14:41:00                         - [facility]

 

           Kenalog    Kenalog    2020 Completed             [facility]



           (Triamcinolone) (Triamcinolone) 14:41:00                         [facility]

 

           Kenalog    Kenalog    2020 Completed             [facility]



           (Triamcinolone) (Triamcinolone) 14:41:00                         [facility]

 

           Kenalog    Kenalog    2020 Completed             [facility]



           (Triamcinolone) (Triamcinolone) 14:41:00                         [facility]

 

           Kenalog    Kenalog    2020 Completed             [facility]



           (Triamcinolone) (Triamcinolone) 14:41:00                         - 

I [facility]    Kenalog    2020 Completed             [facility]



           (Triamcinolone) (Triamcinolone) 14::00                         - [facility]

[facility]    2020 Completed             [facility]



           (Triamcinolone) (Triamcinolone) 14::00                         - 

[facility]    2020 Completed             [facility]



           (Triamcinolone) (Triamcinolone) 14:41:00                         - 

[facility]    2020 Completed             [facility]



           (Triamcinolone) (Triamcinolone) 14::                         - 

[facility]    2020 Completed             [facility]



           (Triamcinolone) (Triamcinolone) 14::00                         - 

[facility]    2020 Completed             [facility]



           (Triamcinolone) (Triamcinolone) 14:41:00                         - 

[facility]    2020 Completed             [facility]



           (Triamcinolone) (Triamcinolone) 14:41:00                         - 

[facility]    2020 Completed             [facility]



           (Triamcinolone) (Triamcinolone) 14:41:00                         - 

[facility]    2020 Completed             [facility]



           (Triamcinolone) (Triamcinolone) 14:41:00                         - 

[facility]    2020 Completed             [facility]



           (Triamcinolone) (Triamcinolone) 14:41:00                         - 

[facility]    2020 Completed             [facility]



           (Triamcinolone) (Triamcinolone) 14:41:00                         - 

[facility]    2020 Completed             [facility]



           (Triamcinolone) (Triamcinolone) 14:41:00                         - 

[facility]    2020 Completed             [facility]



           (Triamcinolone) (Triamcinolone) 14:41:00                         - 

I [facility]    Kenalog    2020 Completed             [facility]



           (Triamcinolone) (Triamcinolone) 14:41:00                         - 

[facility]    Kenalog    2020 Completed             [facility]



           (Triamcinolone) (Triamcinolone) 14:41:00                         - 

I [facility]    Kenalog    2020 Completed             [facility]



           (Triamcinolone) (Triamcinolone) 14:41:00                         - [facility]

 

           Boostrix (Tdap) Boostrix (Tdap) 2019 Completed             [facility]

[facility]



                                 09:15:00                         - [facility]

 

           Boostrix (Tdap) Boostrix (Tdap) 2019 Completed             [facility]

[facility]



                                 09:15:00                         [facility]

 

           Boostrix (Tdap) Boostrix (Tdap) 2019 Completed             [facility]

[facility]



                                 09:15:00                         [facility]

 

           Boostrix (Tdap) Boostrix (Tdap) 2019 Completed             [facility]

[facility]



                                 09:15:00                         - [facility]

 

           Boostrix (Tdap) Boostrix (Tdap) 2019 Completed             [facility]

[facility]



                                 09:15:00                         - [facility]

 

           Boostrix (Tdap) Boostrix (Tdap) 2019 Completed             [facility]

[facility]



                                 09:15:00                         [facility]

 

           Boostrix (Tdap) Boostrix (Tdap) 2019 Completed             [facility]

[facility]



                                 09:15:00                         [facility]

 

           Boostrix (Tdap) Boostrix (Tdap) 2019 Completed             [facility]

[facility]



                                 09:15:00                         [facility]

 

           Boostrix (Tdap) Boostrix (Tdap) 2019 Completed             [facility]

[facility]



                                 09:15:00                         [facility]

 

           Boostrix (Tdap) Boostrix (Tdap) 2019 Completed             [facility]

[facility]



                                 09:15:00                         [facility]

 

           Boostrix (Tdap) Boostrix (Tdap) 2019 Completed             [facility]

[facility]



                                 09:15:00                         [facility]

 

           Boostrix (Tdap) Boostrix (Tdap) 2019 Completed             [facility]

[facility]



                                 09:15:00                         - [facility]

 

           Boostrix (Tdap) Boostrix (Tdap) 2019 Completed             [facility]

[facility]



                                 09:15:00                         [facility]

 

           Boostrix (Tdap) Boostrix (Tdap) 2019 Completed             [facility]

[facility]



                                 09:15:00                         [facility]

 

           Boostrix (Tdap) Boostrix (Tdap) 2019 Completed             [facility]

[facility]



                                 09:15:00                         [facility]

 

           Boostrix (Tdap) Boostrix (Tdap) 2019 Completed             [facility]

[facility]



                                 09:15:00                         [facility]

 

           Boostrix (Tdap) Boostrix (Tdap) 2019 Completed             [facility]

[facility]



                                 09:15:00                         [facility]

 

           Boostrix (Tdap) Boostrix (Tdap) 2019 Completed             [facility]

[facility]



                                 09:15:00                         [facility]

 

           Boostrix (Tdap) Boostrix (Tdap) 2019 Completed             [facility]

[facility]



                                 09:15:00                         [facility]

 

           Boostrix (Tdap) Boostrix (Tdap) 2019 Completed             [facility]

[facility]



                                 09:15:00                         [facility]

 

           Boostrix (Tdap) Boostrix (Tdap) 2019 Completed             [facility]

[facility]



                                 09:15:00                         [facility]

 

           TDAP- Boostrix TDAP- Boostrix 2019 Completed             [facility]



                                 00:00:00                         [facility]    2018 Completed             [facility]



           (Triamcinolone) (Triamcinolone) 14:39:00                         - [facility]    2018 Completed             [facility]



           (Triamcinolone) (Triamcinolone) 14:39:00                         - [facility]    2018 Completed             [facility]



           (Triamcinolone) (Triamcinolone) 14:39:00                         - [facility]    2018 Completed             [facility]



           (Triamcinolone) (Triamcinolone) 14:39:00                         - 

[facility]    2018 Completed             [facility]



           (Triamcinolone) (Triamcinolone) 14:39:00                         - 

[facility]    2018 Completed             [facility]



           (Triamcinolone) (Triamcinolone) 14:39:00                         - 

[facility]    2018 Completed             [facility]



           (Triamcinolone) (Triamcinolone) 14:39:00                         - 

[facility]    2018 Completed             [facility]



           (Triamcinolone) (Triamcinolone) 14:39:00                         - 

[facility]    2018 Completed             [facility]



           (Triamcinolone) (Triamcinolone) 14:39:00                         - [facility]    2018 Completed             [facility]



           (Triamcinolone) (Triamcinolone) 14:39:00                         - 

[facility]    2018 Completed             [facility]



           (Triamcinolone) (Triamcinolone) 14:39:00                         - [facility]    2018 Completed             [facility]



           (Triamcinolone) (Triamcinolone) 14:39:00                         - 

[facility]    2018 Completed             [facility]



           (Triamcinolone) (Triamcinolone) 14:39:00                         - 

[facility]    2018 Completed             [facility]



           (Triamcinolone) (Triamcinolone) 14:39:00                         - 

[facility]    2018 Completed             [facility]



           (Triamcinolone) (Triamcinolone) 14:39:00                         - 

[facility]    2018 Completed             [facility]



           (Triamcinolone) (Triamcinolone) 14:39:00                         - [facility]    2018 Completed             [facility]



           (Triamcinolone) (Triamcinolone) 14:39:00                         - 

[facility]    2018 Completed             [facility]



           (Triamcinolone) (Triamcinolone) 14:39:00                         - 

[facility]    2018 Completed             [facility]



           (Triamcinolone) (Triamcinolone) 14:39:00                         - 

I [facility]    Kenalog    2018 Completed             [facility]



           (Triamcinolone) (Triamcinolone) 14:39:00                         - 

[facility]    2018 Completed             [facility]



           (Triamcinolone) (Triamcinolone) 14:39:00                         - 

I [facility]







Vital Signs







             Vital Name   Observation Time Observation Value Comments     Source

 

             Systolic blood 2023 06:00:00 122 mm[Hg]                [facility]

[facility] of



             pressure                                            [facility]

 

             Diastolic blood 2023 06:00:00 69 mm[Hg]                 Unive

rsity of



             [facility]

 

             Heart rate   2023 06:00:00 68 /min                   [facility]

 

             Respiratory rate 2023 06:00:00 18 /min                   [facility]

 

             Oxygen saturation in 2023 06:00:00 96 /min                   

[facility]



             Arterial blood by                                        [facility]

[facility]



             Pulse oximetry                                        [place]

 

             Body temperature 2023 05:09:00 36.5 Светлана                  [facility]

 

             Body height  2023 05:09:00 157.5 cm                  [facility]

 

             Body weight  2023 05:09:00 88.451 kg                 [facility]

 

             BMI          2023 05:09:00 35.67 kg/m2               [facility]

 

             Systolic blood 2022 17:08:00 128 mm[Hg]                [facility]

sity of



             [facility]

 

             Diastolic blood 2022 17:08:00 84 mm[Hg]                 Unive

rsity of



             [facility]

 

             Heart rate   2022 17:08:00 92 /min                   [facility]

 

             Body temperature 2022 17:08:00 37.28 Светлана                 [facility]

 

             Respiratory rate 2022 17:08:00 18 /min                   [facility]

 

             Body height  2022 17:08:00 157.5 cm                  [facility]

 

             Body weight  2022 17:08:00 91.173 kg                 [facility]

 

             BMI          2022 17:08:00 36.76 kg/m2               [facility]

 

             height       2021 11:30:00 62 [in_i]                 [facility]

[facility]

 

             weight       2021 11:30:00 190 [lb_av]               [facility]

 

             temperature  2021 11:30:00 98.5 [degF]               [facility]

 

             bmi          2021 11:30:00 34.75 kg/m2               [facility]

 

             blood pressure 2021 11:30:00 130 mm[Hg]                [facility]

 [facility] -



             systolic                                            [facility]

 

             blood pressure 2021 11:30:00 75 mm[Hg]                 [facility]

 [facility] -



             diastolic                                           [facility]

 

             height       2021 11:50:00 62 [in_i]                 [facility]

 

             weight       2021 11:50:00 193 [lb_av]               [facility]

[facility]

 

             temperature  2021 11:50:00 98 [degF]                 [facility]

 

             bmi          2021 11:50:00 35.30 kg/m2               [facility]

[facility]

 

             blood pressure 2021 11:50:00 131 mm[Hg]                [facility]

 [facility] -



             systolic                                            [facility]

 

             blood pressure 2021 11:50:00 70 mm[Hg]                 [facility]

 [facility] -



             diastolic                                           [facility]

 

             height       2021 10:40:00 62 [in_i]                 [facility]

 

             weight       2021 10:40:00 192.0 [lb_av]              [facility] 

[facility] -



                                                                 [facility]

 

             temperature  2021 10:40:00 97.1 [degF]               [facility]

[facility] -



                                                                 [facility]

 

             bmi          2021 10:40:00 35.11 kg/m2               [facility]

[facility]

 

             oximetry     2021 10:40:00 100 %                     [facility]

[facility]

 

             respiratory rate 2021 10:40:00 16 /min                   Comm

on [facility] -



                                                                 [facility]

 

             blood pressure 2021 10:40:00 134 mm[Hg]                [facility]

 [facility] -



             systolic                                            [facility]

 

             blood pressure 2021 10:40:00 74 mm[Hg]                 Common

 [facility] -



             diastolic                                           [facility]

 

             Systolic blood 2021 20:32:00 126 mm[Hg]                Univer

sity of



             [facility]

 

             Diastolic blood 2021 20:32:00 81 mm[Hg]                 Unive

rsity of



             [facility]

 

             Heart rate   2021 20:32:00 95 /min                   [facility]

 

             Body temperature 2021 20:32:00 37.06 Светлана                 [facility]

 

             Respiratory rate 2021 20:32:00 12 /min                   [facility]

 

             Body height  2021 20:32:00 157.5 cm                  [facility]

 

             Body weight  2021 20:32:00 83.915 kg                 [facility]

 

             BMI          2021 20:32:00 33.84 kg/m2               [facility]

 

             Oxygen saturation in 2021 20:32:00 98 /min                   

[facility]



             Arterial blood by                                        [facility]

[facility]



             Pulse oximetry                                        [facility]

 

             Systolic blood 2021 20:32:00 126 mm[Hg]                Univer

sity of



             [facility]

 

             Diastolic blood 2021 20:32:00 81 mm[Hg]                 Unive

rsity of



             [facility]

 

             Heart rate   2021 20:32:00 95 /min                   [facility]

 

             Body temperature 2021 20:32:00 37.06 Светлана                 [facility]

 

             Respiratory rate 2021 20:32:00 12 /min                   [facility]

ersity of



                                                                 [facility]



                                                                 [facility]

 

             Body height  2021 20:32:00 157.5 cm                  [facility]

ty [facility]



                                                                 [facility]



                                                                 [facility]

 

             Body weight  2021 20:32:00 83.915 kg                 [facility]

ty of



                                                                 [facility]



                                                                 [facility]

 

             BMI          2021 20:32:00 33.84 kg/m2               [facility]

ty of



                                                                 [facility]



                                                                 [facility]

 

             Oxygen saturation in 2021 20:32:00 98 /min                   

[facility]



             Arterial blood by                                        [facility]

[facility]



             Pulse oximetry                                        [facility]

 

             Systolic blood 2020 23:15:00 121 mm[Hg]                [facility]

sity of



             pressure                                            [facility]



                                                                 [facility]

 

             Diastolic blood 2020 23:15:00 87 mm[Hg]                 Unive

rsity of



             pressure                                            [facility]



                                                                 [facility]

 

             Heart rate   2020 23:15:00 90 /min                   [facility]

ty of



                                                                 [facility]



                                                                 [facility]

 

             Body temperature 2020 23:15:00 37.67 Светлана                 Univ

ersity of



                                                                 [facility]



                                                                 [facility]

 

             Respiratory rate 2020 23:15:00 18 /min                   Univ

ersity of



                                                                 [facility]



                                                                 [facility]

 

             Body height  2020 23:15:00 157.5 cm                  [facility]

ty of



                                                                 [facility]



                                                                 [facility]

 

             Body weight  2020 23:15:00 93.441 kg                 [facility]

ty of



                                                                 [facility]



                                                                 [facility]

 

             BMI          2020 23:15:00 37.68 kg/m2               [facility]

ty of



                                                                 [facility]



                                                                 [facility]

 

             Oxygen saturation in 2020 23:15:00 99 /min                   

[facility]



             Arterial blood by                                        [facility]

[facility]



             Pulse oximetry                                        [facility]

 

             Systolic blood 2020 23:15:00 121 mm[Hg]                Un[facility]

sity of



             pressure                                            [facility]



                                                                 [facility]

 

             Diastolic blood 2020 23:15:00 87 mm[Hg]                 Unive

rsity of



             pressure                                            [facility]



                                                                 [facility]

 

             Heart rate   2020 23:15:00 90 /min                   Universi

ty of



                                                                 [facility]



                                                                 Branch

 

             Body temperature 2020 23:15:00 37.67 Светлана                 Univ

ersity of



                                                                 [facility]



                                                                 Branch

 

             Respiratory rate 2020 23:15:00 18 /min                   Univ

ersity of



                                                                 [facility]



                                                                 Branch

 

             Body height  2020 23:15:00 157.5 cm                  Universi

ty of



                                                                 [facility]



                                                                 Branch

 

             Body weight  2020 23:15:00 93.441 kg                 Universi

ty of



                                                                 [facility]



                                                                 Branch

 

             BMI          2020 23:15:00 37.68 kg/m2               Universi

ty [facility]

 

             Oxygen saturation in 2020 23:15:00 99 /min                   

University of



             Arterial blood by                                        [place] [facility]

[place]



             Pulse oximetry                                        Branch

 

             height       2020 11:10:00 62 [in_i]                 [facility]

 

             weight       2020 11:10:00 221 [lb_av]               [facility]

 

             temperature  2020 11:10:00 98 [degF]                 [facility]

 

             bmi          2020 11:10:00 40.42 kg/m2               [facility]

 

             blood pressure 2020 11:10:00 132 mm[Hg]                Common

 Spirit -



             systolic                                            [facility]

 

             blood pressure 2020 11:10:00 74 mm[Hg]                 Common

 [facility] -



             diastolic                                           [facility]

 

             height       2020 14:30:00 62 [in_i]                 [facility]

 

             weight       2020 14:30:00 220 [lb_av]               [facility]

 

             temperature  2020 14:30:00 98.7 [degF]               [facility]

 

             bmi          2020 14:30:00 40.23 kg/m2               [facility]

 

             blood pressure 2020 14:30:00 128 mm[Hg]                Common

 Spirit -



             systolic                                            [facility]

 

             blood pressure 2020 14:30:00 71 mm[Hg]                 Common

 [facility] -



             diastolic                                           [facility]

 

             Respiratory rate 2020 02:41:00 18 /min                   Univ

ersity of



                                                                 [facility]

 

             Body weight  2020 02:41:00 96.163 kg                 Universi

ty of



                                                                 [facility]

 

             BMI          2020 02:41:00 39.41 kg/m2               Universi

ty [facility]

 

             Oxygen saturation in 2020 02:41:00 99 /min                   

University of



             Arterial blood by                                        [place] [facility]

[place]



             Pulse oximetry                                        Branch

 

             Systolic blood 2020 02:41:00 129 mm[Hg]                Univer

sity of



             [facility]

 

             Diastolic blood 2020 02:41:00 85 mm[Hg]                 Unive

rsity of



             [facility]

 

             Heart rate   2020 02:41:00 77 /min                   [facility]

 

             Body temperature 2020 02:41:00 36.94 Светлана                 [facility]

 

             Respiratory rate 2020 02:41:00 18 /min                   [facility]

 

             Body weight  2020 02:41:00 96.163 kg                 [facility]

 

             BMI          2020 02:41:00 39.41 kg/m2               [facility]

 

             Oxygen saturation in 2020 02:41:00 99 /min                   

[facility]



             Arterial blood by                                        [place] [facility]

[place]



             Pulse oximetry                                        Branch

 

             Systolic blood 2020 02:41:00 129 mm[Hg]                Univer

sity of



             [facility]

 

             Diastolic blood 2020 02:41:00 85 mm[Hg]                 Unive

rsity of



             [facility]

 

             Heart rate   2020 02:41:00 77 /min                   [facility]

 

             Body temperature 2020 02:41:00 36.94 Светлана                 [facility]







Procedures







                Procedure       Date / Time Performed Performing Clinician Sour

e

 

                EKG-12 LEAD     2023 06:13:33 Joe John  [facility]

 

                XR CHEST 1 VW   2023 05:36:11 Joe John  [facility]

 

                TROPONIN I      2023 05:24:00 Joe John  [facility]

 

                BASIC METABOLIC PANEL 2023 05:24:00 Joe John  [facility]



                (NA, K, CL, CO2,                                 Medical Branch



                GLUCOSE, BUN,                                   



                CREATININE, CA)                                 

 

                CBC WITH DIFF   2023 05:24:00 Joe John  [facility]

 

                POCT PREGNANCY TEST 2023 05:23:00 Joe John  [facility]

 

                CONSENT/REFUSAL FOR 2023 05:08:26 Doctor Unassigned, No Un

[facility]



                DIAGNOSIS AND                   Name            Medical Branch



                TREATMENT                                       

 

                POCT MOLECULAR FLU 2022 17:15:00 Unknown, Attending [facility]

 

                ASSIGNMENT OF BENEFITS 2022 17:03:05 Doctor Unassigned, No

 [facility]



                                                Name            Medical Branch

 

                POCT SARS-COV-2 2022 14:33:00 Joana Harrison [facility]



                ANTIGEN (BINAX NOW)                                 Medical Bran

ch

 

                COVID-19 (ID NOW RAPID 2022 11:31:00 Jan Laguna  [facility]



                TESTING)                                        Medical Branch

 

                CONSENT/REFUSAL FOR 2022 11:17:13 Doctor Unassigned, No Un

[facility] of 

[place]



                DIAGNOSIS AND                   Name            Medical Branch



                TREATMENT                                       

 

                POCT GRP A STREP 2021 20:50:00 Faina Avitia [facility]

[facility] [place]



                (MOLECULAR)                                     Medical Branch

 

                ADC,CLC OR LCC ONLY - 2020 23:23:00 [facility]

[facility]



                INFLUENZA A & B DIRECT                                 Medical B

ranch



                ANTIGEN                                         

 

                COVID-19 (ID NOW RAPID 2020 23:23:00 Toño [facility]

[facility]



                TESTING)                                        Medical Branch

 

                CONSENT/REFUSAL FOR 2020 23:09:20 Doctor Unassigned, No [facility] 

[place]



                DIAGNOSIS AND                   Name            Medical Branch



                TREATMENT                                       

 

                XR FOOT 3+ VW RIGHT 2020 03:01:36 Nica Bennett [facility]

[facility] [place]



                                                                [facility]

 

                CONSENT/REFUSAL FOR 2020 02:20:26 Doctor Unassigned, No Un

[facility] 

[place]



                DIAGNOSIS AND                   Name            Medical Branch



                TREATMENT                                       

 

                NOTICE OF PRIVACY 2020 02:20:09 Doctor Unassigned, No [facility]



                PRACTICES                       Name            Medical Branch







Encounters







        Start   End     Encounter Admission Attending Care    Care    Encounter 

Source



        Date/Time Date/Time Type    Type    Clinicians Facility Department ID   

   

 

        2022         Outpatient         LagunaSTLETY  [facility]  [id number]

 Common



        12:40:00                         Scott                     [facility]

 

        2022         Outpatient         LagunaCHANNING aceves  [facility]  [id number]

 Common



        14:19:02                         Scott                     [facility]

 

        2022         Outpatient         LagunaYVROSE acevesLC  [facility]  [id number]

 Common



        14:10:45                         Scott                  [id number]   [facility]

 

        2022         Outpatient         Laguna,  [facility]  [facility]  [id number]

 Common



        13:46:36                         Scott                  [id number]   [facility]

 

        2022         Outpatient         Laguna,  [facility]  [facility]  [id number]

 Common



        13:11:27                         Scott                  [id number]   [facility]

 

        2022         Outpatient         Laguna,  [facility]  [facility]  [id number]

 Common



        12:08:04                         Scott                  [id number]   [facility]

 

        2022         Outpatient         Laguna,  [facility]  [facility]  [id number]

 Common



        11:54:59                         Scott                  [id number]   [facility]

 

        2022         Outpatient         Laguna,  [facility]C  [facility]  [id number]

 Common



        11:52:43                         Scott                  [id number]   [facility]

 

        2022         Outpatient         Laguna,  STL[facility]C  [facility]  [id number]

 Common



        11:52:08                         Scott                  [id number]   [facility]

 

        2022         Outpatient         Laguna,  [facility]  [facility]  [id number]

 Common



        11:42:44                         Scott                  [id number]   [facility]

 

        2022         Outpatient         Laguna,  [facility]  [facility]  [id number]

 Common



        11:18:20                         Scott                  [id number]   [facility]

 

        2022         Outpatient         Laguna,  [facility]  [facility]  [id number]

 Common



        11:16:21                         Scott                  [id number]   [facility]

 

        2022         Outpatient         Laguna,  [facility]  [facility]  [id number]

 Common



        11:12:24                         Scott                  [id number]   [facility]

 

        2022         Outpatient         Laguna,  STLMLC  [facility]  [id number]

 Common



        11:09:12                         Scott                  [id number]   [facility]

 

        2021-10-30         Emergency                 [facility]    [id number] 

Univers



        22:22:47                                                         ity [facility]

 

        2021-10-30         Emergency                 [facility]    [id number] 

Univers



        10:51:52                                                         [facility]

 

        2021-10-29         Emergency                 [facility]    [id number] 

Univers



        15:52:48                                                         ity of



                                                                        [facility]

 

        2023 Emergency X       ALVARO  [facility]    ERT     [id number]

[id number] Univers



        23:09:00 00:38:00                 JOE                           ity of



                                                                        [facility]

 

        2023 Emergency         [facility]    [id number]

[id number] Univers



        23:09:00 00:38:00                 [facility] 350.1.13.10         i

ty [facility] 4.2.7.2.686         [place]

[facility]  [phone]         Medi

nicola



                                                        [address]             [place]

 

        2022 [facility]    1.2.840.114 [id number]

[id number] Univers



        00:00:00 00:00:00                 Rachel  HEALTH  350.1.13.10         it

y of



                                                [place] 4.2.7.2.686         Kb

as



                                                ANTHONY?BLEA [phone]         [address]



                                                MEDICAL                 



                                                OFFICE                  



                                                [facility]                 

 

        2022 Urgent          Rachel Enrique [facility]    1.2.840.114 [id number]

[id number] Univers



        11:20:00 11:40:00 Care            Unknown, Attending HEALTH  350.1.13.10

         ity of



                                                [place] 4.2.7.2.686         Kb

as



                                                ANTHONY?BLEA [phone]         [address]



                                                MEDICAL                 



                                                OFFICE                  



                                                [facility]                 

 

        2022 Outpatient R       KLAUS   [facility]    [id number]

[id number] Univers



        11:20:00 11:20:00                 RACHEL                          [facility]

 

        2022 Orders          Doctor  KIERAN    1.2.840.114 [id number]

[id number] Univers



        00:00:00 00:00:00 Only            Unassigned, JOHN   350.1.13.10       

  ity of



                                        [place] [facility] 4.2.7.2.686         Kb

as



                                                        [phone]         Medi

nicola



                                                        [address]             [place]

 

        2022 Letter          Klaus   [facility]    1.2.840.114 [id number]

[id number] Univers



        00:00:00 00:00:00 (Out)           [facility]  350.1.13.10         it

y of



                                                ANG[place] 4.2.7.2.686         Kb

as



                                                ANTHONY?BLEA [phone]         [address]



                                                MEDICAL                 



                                                OFFICE                  



                                                [facility]                 

 

        2022 Technician         Only, Ang Db Test [facility]    1.2.8

40.114 [id number] 

Univers



        09:00:00 09:15:00 Visit           Joana Harrison [facility]  350.1.13.10 

        ity of



                                                [facility] 4.2.7.2.686         Kb

as



                                                ANTHONY?BLEA [phone]         [address]                 



                                                OFFICE                  



                                                [facility]                 

 

        2022 Outpatient R       [facility]    [id number]

[id number] Univers



        09:00:00 09:00:00                 JOANA                         johnnyy o

f



                                                                        [facility]

 

        2022 Technician         Only, Ang Db Test [facility]    1.2.8

40.114 [id number] 

Univers



        10:00:00 10:15:00 Visit           Rico George [facility]  350.1.13.10    

     ity of



                                                [place] 4.2.7.2.686         Kb

as



                                                ANTHONY?BLEA [phone]         [address]                 



                                                OFFICE                  



                                                [facility]                 

 

        2022 Outpatient R       [facility]    [id number]

[id number] Univers



        10:00:00 10:13:28                 RICO                           [facility]

 

        2022 Outpatient R       KATY  [facility]    [id number]

[id number] Univers



        05:18:04 23:59:00                 JAN                           [facility]

 

        2022 [facility]         Jan Laguna [facility]    1.2.840.11

[id number] Univers



        05:18:04 23:59:00 Encounter         Only, Adc Ed Test [place] 350.1.13.

10         ity of



                                                [place] 4.2.7.2.686         [place]

s



                                                [place]  [phone]         [address]

 

        2022 Orders          Doctor  KIERAN    1.2.840.114 [id number]

[id number] Univers



        00:00:00 00:00:00 Only            Unassigned, JOHN   350.1.13.10       

  ity of



                                        [place] [facility] 4.2.7.2.686         Kb

as



                                                        [phone]         Medi

nicola



                                                        [address]             [place]

 

        2021 Telephone         Lin Grady    1.2.840.114 

[id number] Univers



        00:00:00 00:00:00                         JHON   350.1.13.10         it

y of



                                                HOSPITAL 4.2.7.2.686         Kb

as



                                                        [phone]         Medi

nicola



                                                        [address]             [place]

 

        2021 Outpatient R       [facility]    [id number]

[id number] Univers



        18:45:00 20:03:40                 RICO                           itcarlos [facility]

 

        2021 Laboratory         Only, Ang Db Test [facility]    1.2.8

40.114 [id number] 

Univers



        18:45:00 19:00:00 Only            Nereidahialex [facility]  350.1.13.10    

     ity of



                                                [place] 4.2.7.2.686         Kb

as



                                                ANTHONY?BLEA [phone]         [address]



                                                MEDICAL                 



                                                OFFICE                  



                                                [facility]                 

 

        2021 Outpatient R       KLAUS   [facility]    [id number]

[id number] Univers



        15:15:00 15:30:14                 RACHEL                          carlos [facility]

 

        2021 Technician         Only, Ang Db Test [facility]    1.2.8

40.114 [id number] 

Univers



        15:09:57 15:24:57 Visit           Klaus [facility]  350.1.13.10      

   ity of



                                                [facility] 4.2.7.2.686         Bk

as



                                                ANTHONY?BLEA [phone]         [address]



                                                MEDICAL                 



                                                OFFICE                  



                                                [facility]                 

 

        2021 Technician         Only, Ang Db Test [facility]    1.2.8

40.114 [id number] 

Univers



        13:14:08 13:29:08 Visit           Joana Harrison [employer]  350.1.13.10 

        ity of



                                                [place] 4.2.7.2.686         Kb

as



                                                ANTHONY?BLEA [phone]         [address]



                                                MEDICAL                 



                                                OFFICE                  



                                                [facility]                 

 

        2021 Outpatient R       [facility]    [id number]

[id number] Univers



        13:30:00 13:23:32                 JOANA crenshaw



                                                                        [facility]

 

        2021 OFFICE                  [facility]  [facility]  [id number] Co

mmon



        00:00:00 00:00:00 VISIT                                           [facility]



                        ESTAB PT                                         - [facility]



                        LEVEL 4                                         [facility]

 

        2021 (TEL)                   [facility]  [facility]  [id number] Co

mmon



        00:00:00 00:00:00                                                 [facility]

 

        2021 OFFICE                  [facility]  [facility]  [id number] Co

mmon



        00:00:00 00:00:00 VISIT EST                                         Spir

it



                        PT LEVEL 3                                         [facility]

 

        2021 (TEL)                   [facility]  [facility]  [id number] Co

mmon



        00:00:00 00:00:00                                                 [facility]

 

        2021 Laboratory         Only, Ang Db Test [facility]    1.2.8

40.114 [id number] 

Univers



        17:51:56 18:01:56 Only            Rosalba [facility]  350.1.13.10      

   praveena [facility] 4.2.7.2.686         Kb

as



                                                Anthony?Blea [phone]         [address]



                                                Medical                 



                                                Office                  



                                                [facility]                 

 

        2021 Outpatient R       ROSALBA [facility]    [id number]

[id number] Univers



        17:55:00 17:55:00                 KIERAN grissom [facility]

 

        2021 OFFICE                  [facility]  [facility]  [id number] Co

mmon



        00:00:00 00:00:00 VISIT EST                                         Spir

it



                        PT LEVEL 3                                         [facility]

 

        2021 (TEL)                   [facility]  [facility]  [id number] Co

mmon



        00:00:00 00:00:00                                                 [facility]

 

        2021 (TEL)                   [facility]  [facility]  [id number] Co

mmon



        00:00:00 00:00:00                                                 [facility] [facility]

 

        2021 Refill          Dante, [facility]    1.2.840.114 [id number]

[id number] Univers



        00:00:00 00:00:00                 [facility]  350.1.13.10         i

ty of



                                                [place] 4.2.7.2.686         Kb

as



                                                Professio [phone]         Me

dical



                                                nal     [address]             [place]



                                                Office                  



                                                Building                 



                                                One                     

 

        2021 Refill          Dante, [facility]    1.2.840.114 [id number]

[id number] 



        00:00:00 00:00:00                 [facility]  350.1.13.10         



                                                [place] [id number]         



                                                Professio [phone]         



                                                nal     [facility]             



                                                Office                  



                                                Building                 



                                                One                     

 

        2021 Urgent          Provider, [facility]    

1.2.840.114 

[id number]                                Univers



        15:32:06 15:52:06 Care            [facility]  350.1.13.

10         ity of



                                                [place] 4.2.7.2.686         Kb

as



                                                Professio [phone]         Me

dical



                                                nal     [address]             [place]



                                                Office                  



                                                Building                 



                                                One                     

 

        2021 Urgent          Provider, [facility]    1.2.224.645 [id number]

[id number] 



        15:32:06 15:52:06 Care            [facility]  350.1.13.10        

 



                                        Care    [place] 4.2.7.2.686         



                                                Professio [phone]         



                                                nal     [facility]             



                                                Office                  



                                                Building                 



                                                One                     

 

        2021 Outpatient R       [facility]    [id number]

[id number] Univers



        15:40:00 15:40:00                 MAURICIO                           ity [facility]

 

        2021 (TEL)                   STLMLC  STLMLC  [id number] Co

mmon



        00:00:00 00:00:00                                                 [facility]

 

        2021 Laboratory         Lab, Adc Fam Pob I [facility]    1.2.

840.114 [id number] 

Univers



        11:15:55 11:35:55 Only            Dante, [facility]  350.1.13.10  

       ity of



                                                [place] 4.2.7.2.686         Kb

as



                                                Professio [phone]         Me

dical



                                                nal     [address]             [place]



                                                Office                  



                                                Building                 



                                                One                     

 

        2021 Laboratory         Lab, [facility]    1.2.840.114 [id number]

[id number] 



        11:15:55 11:35:55 Only            Fam Pob I Health  350.1.13.10         



                                                [place] 4.2.7.2.686         



                                                Professio [phone]         



                                                nal     044             



                                                Office                  



                                                Building                 



                                                One                     

 

        2021 Outpatient R       [facility]    [id number]

[id number] Univers



        11:20:00 11:20:00                 ONEYDA                          [facility]

 

        2021 Outpatient R       [facility]IR [facility]    [id number]

[id number] Univers



        09:00:00 09:00:00                 ULISES                          [facility]

 

        2021 [facility]         [facility]    1.2.840.114 [id number]

[id number] Univers



        00:00:00 00:00:00 Outreach         Amado  PRIMARY 350.1.13.10         i

ty of



                                        [facility]    4.2.7.2.686         [place]

[place]



                                                PA[place] [phone]         Me

dical



                                                        [address]             [place]

 

        2021 Patient         [facility]    1.2.840.114 [id number]

[id number] 



        00:00:00 00:00:00 Outreach         Amado  PRIMARY 350.1.13.10         



                                        Greg  CARE    4.2.7.2.686         



                                                PAVILLION [phone]         



                                                        388             

 

        2021 (TEL)                   STLC  STLC  [id number] Co

mmon



        00:00:00 00:00:00                                                 [facility]

 

        2021 (TEL)                   STLMLC  STLMLC  [id number] Co

mmon



        00:00:00 00:00:00                                                 [facility]

 

        2021-02-10 2021-02-10 (TEL)                   STLMLC  STLMLC  [id number] Co

mmon



        00:00:00 00:00:00                                                 [facility]

 

        2021-02-10 2021-02-10 (TEL)                   STLMLC  STLMLC  [id number] Co

mmon



        00:00:00 00:00:00                                                 [facility]

 

        2021 Outpatient R       SHAKIR[facility]    [id number]

[id number] Univers



        08:30:00 08:30:00                 ULISES grissom of



                                                                        [facility]

 

        2021 (TEL)                   STLMLC  STLMLC  [id number] Co

mmon



        00:00:00 00:00:00                                                 [facility]

 

        2021 (TEL)                   STLMLC  STLMLC  [id number] Co

mmon



        00:00:00 00:00:00                                                 [facility]

 

        2020 (TEL)                   STLMLC  STLMLC  [id number] Co

mmon



        00:00:00 00:00:00                                                 [facility]

 

        2020 (TEL)                   STLMLC  STLMLC  [id number] Co

mmon



        00:00:00 00:00:00                                                 [facility]

 

        2020 (TEL)                   STLMLC  STLMLC  [id number] Co

mmon



        00:00:00 00:00:00                                                 [facility]

 

        2020 Emergency         [facility]    1.2.007.390 [id number]

[id number] [facility]



        17:19:00 17:57:00                 Toño Hyde 350.1.13.10         i

ty of



                                                [place] 4.2.7.2.686         [facility]  [phone]         [address]

 

        2020 Emergency         [facility]    1.2.660.724 [id number]

[id number] 



        17:19:00 17:57:00                 Toño Hyde 350.1.13.10         



                                                [place] 4.2.7.2.[address] [place] [address]  [phone]         



                                                        [facility]             

 

        2020 Orders          Doctor  KIERAN    1.2.840.114 [id number]

 Univers



        00:00:00 00:00:00 Only            Unassigned, JOHN   350.1.13.10       

  ity of



                                        [facility] 4.2.7.2.686         Kb

as



                                                        [phone]         [address]

 

        2020 Orders          Doctor  KIERAN    1.2.840.114 [id number]

[id number] 



        00:00:00 00:00:00 Only            UnassignedJOHN   350.1.13.10       

  



                                        [facility] 4.2.7.2.686         



                                                        [phone]         



                                                        [facility]             

 

        2020 OFFICE                  STLMLC  STLMLC  [id number] Co

mmon



        00:00:00 00:00:00 VISIT EST                                         Spir

it



                        PT LEVEL 3                                         - [facility]



                                                                        [facility]

 

        2020 Laboratory         Lab, [facility] Fam Pob I [facility]    1.2.

840.114 [id number] 

[facility]



        15:47:38 16:07:38 Only            Baylee Simon  350.1.13.10    

     ity of



                                                [place] 4.2.7.2.686         Kb

as



                                                Professio [phone]         Me

dical



                                                [address]



                                                Office                  



                                                Building                 



                                                One                     

 

        2020 Laboratory         Lab, [facility]    1.2.840.114 [id number]

[id number] 



        15:47:38 16:07:38 Only            Fam Pob I Health  350.1.13.10         



                                                [place] 4.2.7.2.686         



                                                Professio [phone]         



                                                [address]                     

 

        2020 Outpatient R       ALFRED  [facility]    [id number]

[id number] Univers



        16:00:00 16:00:00                 BAYLEE                         ity of



                                                                        [facility]

 

        2020 (TEL)                   STLMLC  STLMLC  [id number] Co

mmon



        00:00:00 00:00:00                                                 Spirit



                                                                        [facility]



                                                                        [facility]

 

        2020 OFFICE                  STLMLC  STLMLC  [id number] Co

mmon



        00:00:00 00:00:00 VISIT                                           [facility]



                        ESTAB PT                                         - [facility]



                        LEVEL 4                                         [facility]

 

        2020 (TEL)                   STLMLC  STLMLC  [id number] Co

mmon



        00:00:00 00:00:00                                                 Spirit



                                                                        [facility]



                                                                        [facility]

 

        2020-09-15 2020-09-15 Outpatient                 [facility]

[id number] Common



        14:40:00 14:40:00                         t [employer]         Spir

it



                                                Drive   [facility]

 

        2020 Emergency         [facility]    1.2.417.226 [id number]

[id number] Univers



        21:47:00 22:45:00                 Nica [place] 350.1.13.10         i

ty of



                                                [place] 4.2.7.2.686         [facility]  [phone]         Medi

nicola



                                                        [address]             [place]

 

        2020 Emergency         [facility]    1.2.362.817 [id number]

[id number] 



        21:47:00 22:45:00                 Nica [place] 350.1.13.10         



                                                [place] 4.2.7.2.686         



                                                [place]  [phone]         



                                                        [facility]             

 

        2020 2020-07-10 Laboratory         Only, Dic [facility]    1.2.840.114 [id number]

[id number] 



        15:11:12 16:50:00 Only            Test    HEALTH  350.1.13.10         



                                                FAMILY  4.2.7.2.686         



                                                MEDICINE [phone]         



                                                MENDOZA [address] [place] [address]                  

 

        2020 2020-07-10 Laboratory         Only, Dic Test [facility]    1.2.840.

114 [id number] 

Univers



        15:11:12 16:50:00 Only            Norris Singh HEALTH  350.1.13.10  

       ity of



                                                FAMILY  4.2.7.2.686         [place]



                                                MEDICINE [phone]         Med

ical



                                                MENDOZA [address] [place] [address]                  

 

        2020 Outpatient BLADIMIR SING[facility]    [id number]

[id number] Univers



        15:00:00 15:00:00                 NORRIS grissom [facility]

 

        2020 Outpatient                 [facility]

[id number] Common



        11:15:00 11:15:00                         t [employer]         Spir

it



                                                Drive   [facility]

 

        2020 Outpatient                 [facility]

[id number] Common



        14:30:00 14:30:00                         t [employer]         Spir

it



                                                Drive   [facility]

 

        2020 Outpatient                 [facility]

[id number] Common



        08:18:00 08:18:00                         t [address]         Spir

it



                                                Road    [facility]

 

        2020 Outpatient                 [facility]

[id number] Common



        09:40:00 09:40:00                         t [facility] [address]         Spir

it



                                                Road    [facility]

 

        2020 Outpatient                 [facility]

[id number] Common



        14:37:00 14:37:00                         t [place]   [address]         Spir

it



                                                Drive   [facility]

 

        2020 Outpatient                 [facility]

[id number] Common



        15:10:00 15:10:00                         t [place]   [address]         Spir

it



                                                Drive   [facility]

 

        2020 Outpatient                 [facility]

[id number] Common



        09:56:00 09:56:00                         t [place]   [address]         Spir

it



                                                Drive   [facility]

 

        2020 Outpatient                 [facility]

[id number] Common



        09:15:00 09:15:00                         t [place]   [address]         Spir

it



                                                Drive   [facility]

 

        2020 Outpatient                 [facility]

[id number] Common



        10:30:00 10:30:00                         t [place]   [address]         Spir

it



                                                Drive   [facility]

 

        2020 Outpatient                 [facility]

[id number] Common



        09:40:00 09:40:00                         t [facility] [address]         Spir

it



                                                Road    [facility]

 

        2019 Outpatient                 [facility]

[id number] Common



        08:00:00 08:00:00                         t [place]   [address]         Spir

it



                                                Drive   [facility]

 

        2019 Outpatient                 [facility]

[id number] Common



        11:45:00 11:45:00                         t [place]   [address]         Spir

it



                                                Drive   [facility]

 

        2019 Outpatient                 [facility]

[id number] Common



        08:03:00 08:03:00                         t [place]   [address]         Spir

it



                                                Drive   [facility]

 

        2019-10-08 2019-10-08 Outpatient                 [facility]

[id number] Common



        10:49:00 10:49:00                         t [place]   [address]         Spir

it



                                                Drive   [facility]

 

        2019-10-02 2019-10-02 Outpatient                 [facility]

[id number] Common



        10:56:00 10:56:00                         t [place]   [address]         Spir

it



                                                Drive   [facility]

 

        2019-10-01 2019-10-01 Outpatient                 [facility]

[id number] Common



        16:12:00 16:12:00                         t [place]   [address]         Spir

it



                                                Drive   [facility]

 

        2019 Outpatient                 [facility]

[id number] Common



        09:00:00 09:00:00                         t [place]   [address]         Spir

it



                                                Drive   [facility]

 

        2019 Outpatient                 [facility]

[id number] Common



        14:18:00 14:18:00                         t [place]   [address]         Spir

it



                                                Drive   [facility]

 

        2019 Outpatient                 [facility]

[id number] Common



        08:30:00 08:30:00                         t [place]   [address]         Spir

it



                                                Drive   [facility]

 

        2019 Outpatient                 [facility]

[id number] Common



        10:38:00 10:38:00                         t [place]   [address]         Spir

it



                                                Drive   [facility]

 

        2019 Outpatient                 [facility]

[id number] Common



        08:15:00 08:15:00                         t [place]   [address]         Spir

it



                                                Drive   [facility]

 

        2019 Outpatient                 [facility]

[id number] Common



        08:45:00 08:45:00                         t [place]   [address]         Spir

it



                                                Drive   [facility]

 

        2019 Outpatient                 [facility]

[id number] Common



        08:15:00 08:15:00                         t [place]   [address]         Spir

it



                                                Drive   [facility]

 

        2018-10-03 2018-10-03 Outpatient                 [facility]

[id number] Common



        08:30:00 08:30:00                         t [place]   [address]         Spir

it



                                                Drive   [facility]

 

        2018 Outpatient                 [facility]

[id number] Common



        15:30:00 15:30:00                         t Oak   [address]         Spir

it



                                                Drive   [facility]

 

        2018 Outpatient                 [facility]

[id number] Common



        09:21:00 09:21:00                         t [place]   [address]         Spir

it



                                                Drive   [facility]

 

        2018 Outpatient                 [facility]

[id number] Common



        14:07:00 14:07:00                         t Oak   [address]         Spir

it



                                                Drive   [facility]

 

        2018 Outpatient                 [facility]

[id number] Common



        13:30:00 13:30:00                         t Oak   [address]         Spir

it



                                                Drive   [facility]

 

        2018 Outpatient                 [facility]

[id number] Common



        15:45:00 15:45:00                         t Oak   [address]         Spir

it



                                                Drive   [facility]

 

        2018 Outpatient                 [facility]

[id number] Common



        08:27:00 08:27:00                         t [facility]         Spir

it



                                                Care    [facility]         - [facility]

I



                                                Clinic                  [facility]

 

        2018 Outpatient                 [facility]

[id number] Common



        15:35:00 15:35:00                         t [facility]         Spir

it



                                                Care    [facility]         - [facility]

I



                                                Clinic                  [facility]

 

        2018 Outpatient                 [facility]

[id number] Common



        15:34:00 15:34:00                         t [facility]         Spir

it



                                                Care    [facility]         - [facility]

I



                                                Clinic                  [facility]

 

        2018 Outpatient                 [facility]

[id number] Common



        15:15:00 15:15:00                         t [facility]         Spir

it



                                                Care    [facility]         - [facility]

I



                                                Clinic                  [facility]

 

        2018 Outpatient                 [facility]

[id number] Common



        15:45:00 15:45:00                         t Oak   [address]         Spir

it



                                                Drive   [facility]

 

        2018 Outpatient                 [facility]

[id number] Common



        10:09:00 10:09:00                         t [place]   [address]         Spir

it



                                                Drive   [facility]

 

        2018 Outpatient                 [address]

[id number] Common



        13:00:00 13:00:00                         [place]   [address]         Spir

it



                                                Drive   [facility]

 

        2018 Outpatient                 [address]

[id number] Common



        15:04:00 15:04:00                         [place]   [address]         Spir

it



                                                Drive   [facility]

 

        2018 Outpatient                 [facility] [address]

[id number] Common



        13:54:00 13:54:00                         [place]   [address]         Spir

it



                                                Drive   [facility]

 

        2018 Outpatient                 [facility] [address]

[id number] Common



        11:11:00 11:11:00                         t [place]   [address]         Spir

it



                                                Drive   [facility]

 

        2018-04-10 2018-04-10 Outpatient                 [facility] [address]

[id number] Common



        08:52:00 08:52:00                         [place]   [address]         Spir

it



                                                Drive   [facility]

 

        2018 Outpatient                 [facility] [address]

[id number] Common



        13:00:00 13:00:00                         t [place]   [address]         Spir

it



                                                Drive   [facility]







Results







           Test Description Test Time  Test Comments Results    Result Comments 

Source









                    TROPONIN I          2023 06:05:17 









                      Test Item  Value      Reference Range Interpretation Comme

nts









             TROPONIN I (test code = 5221109286) 0.003 ng/mL  <=0.034           

        

 

             JACQUELIN (test code = JACQUELIN) Reference (Normal) Range (defined by         

                  



                          the 99th percentile reference                         

  



                          limit): <= 0.034 ng/mL Note: Cardiac                  

         



                          troponin begins to rise 3-4 hours                     

      



                          after the onset of ischemia. Repeat                   

        



                          in 4-6 hours if the sample was drawn                  

         



                          within 3-4 hours of the onset of the                  

         



                          symptom and found normal. Diagnosis                   

        



                          of myocardial injury is made with                     

      



                          acute changes in cTn concentrations                   

        



                          with at least one serial sample                       

    



                          above the 99th percentile upper                       

    



                          reference limit (URL), taken                          

 



                          together with the patient's clinical                  

         



                          presentation. Biotin has been                         

  



                          reported to cause a negative bias,                    

       



                          interpret results relative to                         

  



                          patient's use of biotin.                           

 

             Lab Interpretation (test code = Normal                             

    



             91542-4)                                            



[facility] METABOLIC PANEL (NA, K, CL, CO2, 
GLUCOSE, BUN, CREATININE, CA)2023 05:53:36





             Test Item    Value        Reference Range Interpretation Comments

 

             NA (test code = 136 mmol/L   135-145                   



             [id number])                                         

 

             K (test code = 4.1 mmol/L   3.5-5.0                   



             5819163858)                                         

 

             CL (test code = 106 mmol/L                       



             4620127859)                                         

 

             CO2 TOTAL (test code = 22 mmol/L    23-31        L            



             9447312211)                                         

 

             AGAP (test code = 8            2-16                      



             1176296947)                                         

 

             BUN (test code = 16 mg/dL     7-23                      



             8363842893)                                         

 

             GLUCOSE (test code = 86 mg/dL                         



             6742618784)                                         

 

             CREATININE (test code = 0.63 mg/dL   0.50-1.04                 



             6306738194)                                         

 

             CALCIUM (test code = 8.9 mg/dL    8.6-10.6                  



             8525201787)                                         

 

             eGFR (test code = 107.5        mL/min/1.73m2              



             4809067966)                                         

 

             JACQUELIN (test code = JACQUELIN) Association of                           



                          Glomerular Filtration                           



                          Rate (GFR) and Staging                           



                          of Kidney Disease*                           



                          +---------------------                           



                          --+-------------------                           



                          --+-------------------                           



                          ------+| GFR                           



                          (mL/min/1.73 m2) ?|                           



                          With Kidney Damage ?|                           



                          ?Without Kidney                           



                          Damage+---------------                           



                          --------+-------------                           



                          --------+-------------                           



                          ------------+| ?>90 ?                           



                          ? ? ? ? ? ? ? ?|                           



                          ?Stage one ? ? ? ? ?|                           



                          ? Normal ? ? ? ? ? ? ?                           



                          ?+--------------------                           



                          ---+------------------                           



                          ---+------------------                           



                          -------+| ?60-89 ? ? ?                           



                          ? ? ? ? ?| ?Stage two                           



                          ? ? ? ? ?| ? Decreased                           



                          GFR ? ? ? ?                            



                          +---------------------                           



                          --+-------------------                           



                          --+-------------------                           



                          ------+| ?30-59 ? ? ?                           



                          ? ? ? ? ?| ?Stage                           



                          three ? ? ? ?| ? Stage                           



                          three ? ? ? ? ?                           



                          +---------------------                           



                          --+-------------------                           



                          --+-------------------                           



                          ------+| ?15-29 ? ? ?                           



                          ? ? ? ? ?| ?Stage four                           



                          ? ? ? ? | ? Stage four                           



                          ? ? ? ? ?                              



                          ?+--------------------                           



                          ---+------------------                           



                          ---+------------------                           



                          -------+| ?<15 (or                           



                          dialysis) ? ?| ?Stage                           



                          five ? ? ? ? | ? Stage                           



                          five ? ? ? ? ?                           



                          ?+--------------------                           



                          ---+------------------                           



                          ---+------------------                           



                          -------+ *Each stage                           



                          assumes the associated                           



                          GFR level has been in                           



                          effect for at least                           



                          three months. ?Stages                           



                          1 to 5, with or                           



                          without kidney                           



                          disease, indicate                           



                          chronic kidney                           



                          disease. Notes:                           



                          Determination of                           



                          stages one and two                           



                          (with eGFR                             



                          >59mL/min/1.73 m2)                           



                          requires estimation of                           



                          kidney damage for at                           



                          least three months as                           



                          defined by structural                           



                          or functional                           



                          abnormalities of the                           



                          kidney, manifested by                           



                          either:Pathological                           



                          abnormalities or                           



                          Markers of kidney                           



                          damage (including                           



                          abnormalities in the                           



                          composition of the                           



                          blood or urine or                           



                          abnormalities in                           



                          imaging tests).                           

 

             Lab Interpretation Abnormal                               



             (test code = 69893-0)                                        



[facility] WITH [id number] 05:37:37





             Test Item    Value        Reference Range Interpretation Comments

 

             WBC (test code = 8.95         See_Comment                [Automated



             6456-2)                                             message] The sy

stem



                                                                 which generated



                                                                 this result



                                                                 transmitted



                                                                 reference range

:



                                                                 4.30 - 11.10



                                                                 10*3/?L. The



                                                                 reference range

 was



                                                                 not used to



                                                                 interpret this



                                                                 result as



                                                                 normal/abnormal

.

 

             RBC (test code = 4.03         See_Comment                [Automated



             644-8)                                              message] The sy

stem



                                                                 which generated



                                                                 this result



                                                                 transmitted



                                                                 reference range

:



                                                                 3.93 - 5.25



                                                                 10*6/?L. The



                                                                 reference range

 was



                                                                 not used to



                                                                 interpret this



                                                                 result as



                                                                 normal/abnormal

.

 

             HGB (test code = 12.7 g/dL    11.6-15.0                 



             718-7)                                              

 

             HCT (test code = 38.3 %       35.7-45.2                 



             4544-3)                                             

 

             MCV (test code = 95.0 fL      80.6-95.5                 



             787-2)                                              

 

             MCH (test code = 31.5 pg      25.9-32.8                 



             785-6)                                              

 

             MCHC (test code = 33.2 g/dL    31.6-35.1                 



             786-4)                                              

 

             RDW-SD (test code = 43.0 fL      39.0-49.9                 



             50297-5)                                            

 

             RDW-CV (test code = 12.2 %       12.0-15.5                 



             788-0)                                              

 

             PLT (test code = 377          See_Comment  H             [Automated



             497-3)                                              message] The sy

stem



                                                                 which generated



                                                                 this result



                                                                 transmitted



                                                                 reference range

:



                                                                 166 - 358 10*3/

?L.



                                                                 The reference r

michele



                                                                 was not used to



                                                                 interpret this



                                                                 result as



                                                                 normal/abnormal

.

 

             MPV (test code = 10.8 fL      9.5-12.9                  



             34399-8)                                            

 

             NRBC/100 WBC (test 0.0          See_Comment                [Automat

ed



             code = 1643760080)                                        message] 

The system



                                                                 which generated



                                                                 this result



                                                                 transmitted



                                                                 reference range

:



                                                                 0.0 - 10.0 /100



                                                                 WBCs. The refer

ence



                                                                 range was not u

sed



                                                                 to interpret th

is



                                                                 result as



                                                                 normal/abnormal

.

 

             NRBC x10^3 (test code              See_Comment                [Auto

mated



             = [id number])                                        message] The s

ystem



                                                                 which generated



                                                                 this result



                                                                 transmitted



                                                                 reference range

:



                                                                 10*3/?L. The



                                                                 reference range

 was



                                                                 not used to



                                                                 interpret this



                                                                 result as



                                                                 normal/abnormal

.

 

             GRAN MAT (NEUT) % 55.6 %                                 



             (test code = 770-8)                                        

 

             IMM GRAN % (test code 0.30 %                                 



             = 1855842824)                                        

 

             LYMPH % (test code = 23.6 %                                 



             736-9)                                              

 

             MONO % (test code = 9.3 %                                  



             5905-5)                                             

 

             EOS % (test code = 10.1 %                                 



             713-8)                                              

 

             BASO % (test code = 1.1 %                                  



             706-2)                                              

 

             GRAN MAT x10^3(ANC) 4.98 10*3/uL 1.88-7.09                 



             (test code =                                        



             3383472919)                                         

 

             IMM GRAN x10^3 (test 0.03 10*3/uL 0.00-0.06                 



             code = 6630639806)                                        

 

             LYMPH x10^3 (test code 2.11 10*3/uL 1.32-3.29                 



             = 731-0)                                            

 

             MONO x10^3 (test code 0.83 10*3/uL 0.33-0.92                 



             = 742-7)                                            

 

             EOS x10^3 (test code = 0.90 10*3/uL 0.03-0.39    H            



             711-2)                                              

 

             BASO x10^3 (test code 0.10 10*3/uL 0.01-0.07    H            



             = 704-7)                                            

 

             Lab Interpretation Abnormal                               



             (test code = 53420-9)                                        



[facility] PREGNANCY [id number] 05:23:00





             Test Item    Value        Reference Range Interpretation Comments

 

             POCT PREG (test code = 1605) negative                              

 

 

             On board controls acceptable with present                          

      



             C Line (test code = 3574)                                        

 

             POCT PREG LOT # (test code = 3575) [id number]                      

       

 

             POCT PREG TEST  DATE (test 2024                        

     



             code = 3576)                                        

 

             Lab Interpretation (test code = Normal                             

    



             42918-2)                                            



[facility] MOLECULAR [id number] 17:19:48





             Test Item    Value        Reference Range Interpretation Comments

 

             POCT Molecular FluA (test code = Positive     Negative     A       

     



             01240-0)                                            

 

             Lab Interpretation (test code = Abnormal                           

    



             11778-7)                                            



[facility] SARS-COV-2 ANTIGEN (BINAX NOW)2022 
14:33:00





             Test Item    Value        Reference Range Interpretation Comments

 

             POCT SARS-COV-2 ANTIGEN Not Detected Not Detected              



             (test code = 5076)                                        

 

             On board controls Yes                                    



             acceptable with C Line                                        



             (test code = 3574)                                        

 

             JACQUELIN (test code = JACQUELIN) accurate development                         

  



                          and interpretation of                           



                          all internal controls                           

 

             Lab Interpretation Normal                                 



             (test code = 29356-6)                                        



[facility] GRP A STREP (MOLECULAR)2021 
21:00:00





             Test Item    Value        Reference Range Interpretation Comments

 

             POCT GP A STREP (test neg          Negative -                



             code = 37306-9)              Negative                  

 

             JACQUELIN (test code = JACQUELIN) accurate development                         

  



                          and interpretation of                           



                          all internal controls                           

 

             Lab Interpretation Normal                                 



             (test code = 12843-1)                                        



[facility] &amp; B 
DIRECT [id number] 23:47:00





             Test Item    Value        Reference Range Interpretation Comments

 

             Influenza A (test code = 56604-4) Negative     Negative            

      

 

             Influenza B (test code = 50751-2) Negative     Negative            

      

 

             Lab Interpretation (test code = Normal                             

    



             48658-1)                                            



[facility]ID [facility]2020 
23:47:00





             Test Item    Value        Reference Range Interpretation Comments

 

             SARS-CoV-2 Rapid ID NOW Not Detected Not Detected              



             (test code = 86804-7)                                        

 

             JACQUELIN (test code = JACQUELIN) ID NOW COVID-19 Assay                        

   



                          is an isothermal                           



                          nucleic acid                           



                          amplification test                           



                          intended for the                           



                          qualitative detection                           



                          of nucleic acid from                           



                          SARS-CoV-2 viral RNA                           



                          in nasopharyngeal (NP)                           



                          specimens. It is used                           



                          under Emergency Use                           



                          Authorization (EUA) by                           



                          FDA. The limit of                           



                          detection (LOD) of the                           



                          assay is 125 Genome                           



                          Equivalents/mL. A                           



                          positive result is                           



                          indicative of the                           



                          presence of SARS-CoV-2                           



                          RNA. ?Clinical                           



                          correlation with                           



                          patient history and                           



                          other diagnostic                           



                          information is                           



                          necessary to determine                           



                          patient infection                           



                          status. A negative                           



                          (Not Detected) result                           



                          does not preclude                           



                          SARS-CoV-2 infection.                           



                          In patients with                           



                          clinical symptoms and                           



                          other tests that are                           



                          consistent with                           



                          SARS-CoV-2 infection,                           



                          negative results                           



                          should be treated as                           



                          presumptive negative                           



                          and a new specimen                           



                          should be tested with                           



                          alternative PCR                           



                          molecular test.                           



                          Invalid: Please                           



                          collect a new specimen                           



                          for repeat patient                           



                          testing if clinically                           



                          indicated.                             

 

             Lab Interpretation Normal                                 



             (test code = 63088-3)                                        



[facility] [id number] 03:11:13 No 
acute bony abnormality is present. EXAM: XR FOOT 3+ VW RIGHT HISTORY: right foot
pain sp slip and fall, previous fx in past COMPARISON: None FINDINGS: Imaging of
the foot demonstrates maintenance of alignment. A bone island isseen within the 
posterior superior calcaneal body. Joint spaces arepreserved. [facility], [facility] 
Results Inft User - 2020 10:12 PM CDTEXAM:XR FOOT 3+ VW RIGHTHISTORY:right
foot pain sp slip and fall, previous fx in past COMPARISON:NoneFINDINGS: Imaging
of the foot demonstrates maintenance of alignment. A bone island isseen within 
the posterior superior calcaneal body. Joint spaces arepreserved.IMPRESSIONNo 
acute bony abnormality is present.[facility] 
AntigenSARS-COV 2 Antigen

## 2023-03-23 NOTE — RAD REPORT
EXAM DESCRIPTION:  RAD - Chest Single View - 3/23/2023 5:29 pm

 

CLINICAL HISTORY:  PALPITATIONS

 

COMPARISON:  Chest Pa And Lat (2 Views) dated 12/16/2020; Chest Single View dated 11/3/2018

 

FINDINGS:  Lines: None.

Lungs: No evidence of edema or pneumonia.

Pleural: No significant pleural effusions or pneumothorax.

Cardiac: The heart size is within normal limits.

Mediastinum: Within normal limits.

Bones: No acute fractures.

Other: None

 

IMPRESSION:  No acute cardiopulmonary disease.

## 2023-03-23 NOTE — ER
Nurse's Notes                                                                                     

 [facility]                                                                 

Name: Lesley Campa                                                                               

Age: 37 yrs                                                                                       

Sex: Female                                                                                       

: 1985                                                                                   

MRN: [id number]                                                                                   

Arrival Date: 2023                                                                          

Time: 16:49                                                                                       

Account#: [id number]                                                                            

Bed 5                                                                                             

Private MD:                                                                                       

Diagnosis: Palpitations                                                                           

                                                                                                  

Presentation:                                                                                     

                                                                                             

16:56 Chief complaint: Patient states: "For the past week and a half I've been having         aa5 

      palpitations and every time I get them it makes me feel short of breath". Pt reports        

      she was instructed by PCP to come to ER, pt also reports she began a Keto diet              

      approximately 2 weeks ago. Coronavirus screen: At this time, the client does not            

      indicate any symptoms associated with coronavirus-19. Ebola Screen: Patient denies          

      travel to an Ebola-affected area in the 21 days before illness onset. Initial Sepsis        

      Screen: Does the patient meet any 2 criteria? No. Patient's initial sepsis screen is        

      negative. Does the patient have a suspected source of infection? No. Patient's initial      

      sepsis screen is negative. Risk Assessment: Do you want to hurt yourself or someone         

      else? Patient reports no desire to harm self or others. Onset of symptoms was 2023.                                                                                       

16:56 Acuity: KAYLA 3                                                                           aa5 

16:56 Method Of Arrival: Ambulatory                                                           aa5 

                                                                                                  

Triage Assessment:                                                                                

17:00 General: Appears in no apparent distress. comfortable, Behavior is cooperative,         bp  

      appropriate for age, anxious. Pain: Denies pain. EENT: No deficits noted. Neuro: No         

      deficits noted. Cardiovascular: Reports PALPITATIONS. Respiratory: Reports shortness of     

      breath Onset: The symptoms/episode began/occurred at an unknown time. the patient           

      reports symptoms have resolved. GI: No signs and/or symptoms were reported involving        

      the gastrointestinal system. : No signs and/or symptoms were reported regarding the       

      genitourinary system. Derm: No deficits noted. Musculoskeletal: No deficits noted.          

                                                                                                  

Historical:                                                                                       

- Allergies:                                                                                      

16:58 Sudafed (RAPID HEART RATE);                                                             aa5 

16:58 Tramadol HCl (Unable to sleep);                                                         aa5 

16:58 Morphine (hallucinations);                                                              aa5 

- PMHx:                                                                                           

16:58 ADD/ADHD; Anxiety; Depression; ESSENTIAL TREMORS; Hypothyroidism;                       aa5 

                                                                                                  

- Immunization history:: Adult Immunizations unknown.                                             

- Social history:: Smoking status: Patient/guardian denies using tobacco, the patient             

  reports quitting approximately 2 years ago.                                                     

                                                                                                  

                                                                                                  

Screenin:00 [facility] ED Fall Risk Assessment (Adult) History of falling in the last 3 months,       bp  

      including since admission No falls in past 3 months (0 pts). Abuse screen: Denies           

      threats or abuse. Denies injuries from another. Nutritional screening: No deficits          

      noted. Tuberculosis screening: No symptoms or risk factors identified.                      

                                                                                                  

Assessment:                                                                                       

17:00 General: SEE TRIAGE NOTE.                                                               bp  

17:55 General: Appears in no apparent distress. uncomfortable, Behavior is cooperative,       jl7 

      appropriate for age, anxious. Pain: Denies pain. Neuro: Level of Consciousness is           

      awake, alert, obeys commands, Oriented to person, place, time, situation.                   

      Cardiovascular: Reports palpitations, shortness of breath, Patient's skin is warm and       

      dry. Derm: Skin is pink, warm \T\ dry.                                                      

18:00 Cardiovascular: Rhythm is sinus rhythm. Respiratory: Airway is patent Respiratory       bp  

      effort is even, unlabored, Breath sounds are clear bilaterally.                             

18:47 Reassessment: TBDC.                                                                     bp  

                                                                                                  

Vital Signs:                                                                                      

16:56  / 83; Pulse 68; Resp 16 S; Temp 98(TE); Pulse Ox 100% on R/A; Weight 88 kg (R);  aa5 

      Height 5 ft. 2 in. (R);                                                                     

18:00  / 70; Pulse 61; Resp 11; Pulse Ox 100% ;                                         bp  

18:49  / 77; Pulse 59; Resp 11; Pulse Ox 100% ;                                         bp  

16:56 Body Mass Index 35.48 (88.00 kg, 157.48 cm)                                             aa5 

                                                                                                  

ED Course:                                                                                        

16:49 Patient arrived in ED.                                                                  mr  

16:56 Arm band placed on.                                                                     aa5 

16:58 Triage completed.                                                                       aa5 

17:00 Patient has correct armband on for positive identification. Bed in low position. Call   bp  

      light in reach. Side rails up X 1.                                                          

17:01 Isaiah Quick MD is Attending Physician.                                               [id number]

17:09 EKG completed in triage. Results shown to MD.                                           ss  

17:25 Initial lab(s) drawn, by me, sent to lab. Inserted saline lock: 20 gauge in right       aa5 

      antecubital area, using aseptic technique. Blood collected.                                 

17:31 XRAY Chest (1 view) In Process Unspecified.                                             EDMS

17:51 Kaiser, Jahala, RN is Primary Nurse.                                                      jl7 

17:55 Client placed on continuous cardiac and pulse oximetry monitoring. NIBP monitoring      jl7 

      applied.                                                                                    

18:21 Johnny Aguilar MD is Referral Physician.                                               [email or web address]

18:49 No provider procedures requiring assistance completed. IV discontinued, intact,         bp  

      bleeding controlled, No redness/swelling at site. Pressure dressing applied.                

                                                                                                  

Administered Medications:                                                                         

17:36 Drug: NS 0.9% IV 1000 ml Route: IV; Rate: 1 bolus; Site: right antecubital;             aa5 

18:50 Follow up: IV Status: Completed infusion; IV Intake: 1000ml                             bp  

                                                                                                  

                                                                                                  

Medication:                                                                                       

18:50 VIS not applicable for this client.                                                     bp  

                                                                                                  

Intake:                                                                                           

18:50 IV: 1000ml; Total: 1000ml.                                                              bp  

                                                                                                  

Outcome:                                                                                          

18:21 Discharge ordered by MD.                                                                [email or web address]

18:49 Discharged to home ambulatory.                                                          bp  

18:49 Condition: stable                                                                           

18:49 Discharge instructions given to patient, Instructed on discharge instructions, follow       

      up and referral plans. Demonstrated understanding of instructions, follow-up care.          

19:17 Patient left the ED.                                                                    jl7 

                                                                                                  

Signatures:                                                                                       

Dispatcher [employer]                           [facility]                                                 

[place]                                 mr Szymanski, Tanesha, RN                     RN   aa5                                                  

Kerrie Silva RN                      RN   Kaylin Salguero RN                        RN   jl7                                                  

Guillaume Diaz, RN                      RN   Isaiah Heller MD MD   [id number]                                                 

                                                                                                  

**************************************************************************************************

## 2023-03-23 NOTE — EDPHYS
Physician Documentation                                                                           

 [facility]                                                                 

Name: Lesley Campa                                                                               

Age: 37 yrs                                                                                       

Sex: Female                                                                                       

: 1985                                                                                   

MRN: [id number]                                                                                   

Arrival Date: 2023                                                                          

Time: 16:49                                                                                       

Account#: [id number]                                                                            

Bed 5                                                                                             

Private MD:                                                                                       

ED Physician Isaiah Quick                                                                        

HPI:                                                                                              

                                                                                             

17:08 Patient is a 37-year-old that for the last 10 days have had a past had a history of     jr11

      heart palpitations, these are short-lived at times feels that these cause shortness of      

      breath, currently without any shortness of breath. Denies exertional pain, no tearing       

      pain, does not radiate to the back, does not cross the diaphragm. No diaphoresis, no        

      vomiting. No syncope or near-syncope. .                                                     

                                                                                                  

Historical:                                                                                       

- Allergies:                                                                                      

16:58 Sudafed (RAPID HEART RATE);                                                             aa5 

16:58 Tramadol HCl (Unable to sleep);                                                         aa5 

16:58 Morphine (hallucinations);                                                              aa5 

- PMHx:                                                                                           

16:58 ADD/ADHD; Anxiety; Depression; ESSENTIAL TREMORS; Hypothyroidism;                       aa5 

                                                                                                  

- Immunization history:: Adult Immunizations unknown.                                             

- Social history:: Smoking status: Patient/guardian denies using tobacco, the patient             

  reports quitting approximately 2 years ago.                                                     

                                                                                                  

                                                                                                  

ROS:                                                                                              

17:08 All other systems are negative.                                                         jr11

                                                                                                  

Exam:                                                                                             

17:08 Constitutional:  This is a well developed, well nourished patient who is awake, alert,  jr11

      and in no acute distress. Head/Face:  Normocephalic, atraumatic. Eyes:  Extra-ocular        

      motions intact.  Lids and lashes normal.  Conjunctiva and sclera are non-icteric and        

      not injected.  Cornea within normal limits.  Periorbital areas with no swelling,            

      redness, or edema. ENT:  Nares patent. No nasal discharge, no septal abnormalities          

      noted.  Oropharynx with no redness, swelling, or masses, exudates, or evidence of           

      obstruction, uvula midline.  Mucous membranes moist. Neck:  Trachea midline, no             

      thyromegaly or masses palpated, and no cervical lymphadenopathy.  Supple, full range of     

      motion without nuchal rigidity, or vertebral point tenderness.  No Meningismus.             

      Chest/axilla:  Normal chest wall appearance and motion.  Nontender with no deformity.       

      No lesions are appreciated. Cardiovascular:  Regular rate and rhythm with a normal S1       

      and S2.  No gallops, murmurs, or rubs.  Normal PMI, no JVD.  No pulse deficits.             

      Respiratory:  Lungs have equal breath sounds bilaterally, clear to auscultation and         

      percussion.  No rales, rhonchi or wheezes noted.  No increased work of breathing, no        

      retractions or nasal flaring. Abdomen/GI:  Soft, non-tender, with normal bowel sounds.      

      No distension or tympany.  No guarding or rebound.  No evidence of tenderness               

      throughout. Back:  No spinal tenderness.  No costovertebral tenderness.  Full range of      

      motion. Skin:  Warm, dry with normal turgor.  Normal color with no rashes, no lesions,      

      and no evidence of cellulitis. MS/ Extremity:  Pulses equal, no cyanosis.                   

      Neurovascular intact.  Full, normal range of motion. Neuro:  Awake and alert, GCS 15,       

      oriented to person, place, time, and situation.  No gross motor or sensory deficits.        

                                                                                                  

Vital Signs:                                                                                      

16:56  / 83; Pulse 68; Resp 16 S; Temp 98(TE); Pulse Ox 100% on R/A; Weight 88 kg (R);  aa5 

      Height 5 ft. 2 in. (R);                                                                     

18:00  / 70; Pulse 61; Resp 11; Pulse Ox 100% ;                                         bp  

18:49  / 77; Pulse 59; Resp 11; Pulse Ox 100% ;                                         bp  

16:56 Body Mass Index 35.48 (88.00 kg, 157.48 cm)                                             aa5 

                                                                                                  

MDM:                                                                                              

17:06 Patient medically screened.                                                             jr11

17:08 Differential diagnosis: arrythmia, dehydration, stress disorder. Data reviewed: vital   [facility]

      signs, nurses notes. ED course: Patient is a 37-year-old with history of palpitations       

      for the last 10 days, EKG interpreted by me shows normal sinus rhythm, normal axis,         

      normal intervals, no acute ST changes, EKG normal. Patient will be assessed for             

      electrolyte abnormalities, myocarditis, no concern for ACS, no active chest pain. We        

      will send troponin. Patient otherwise well-appearing, if work-up is benign would            

      recommend cardiology follow-up for Holter monitor.                                          

18:19 ED course: Patient's work-up is benign, to follow-up cardiology for Holter monitor,     jr

      slight transaminitis .                                                                      

                                                                                                  

                                                                                             

17:07 Order name: Basic Metabolic Panel; Complete Time: 18:18                                 jr11

                                                                                             

17:07 Order name: CBC with Diff                                                                                                                                                            

17:07 Order name: LFT's; Complete Time: 18:18                                                                                                                                              

17:07 Order name: NT PRO-BNP; Complete Time: 18:18                                                                                                                                         

17:07 Order name: Troponin HS; Complete Time: 18:18                                                                                                                                        

17:35 Order name: UAM; Complete Time: 18:18                                                                                                                                                

17:07 Order name: XRAY Chest (1 view); Complete Time: 17:36                                                                                                                                

17:07 Order name: EKG; Complete Time: 17:08                                                                                                                                                

17:07 Order name: Cardiac monitoring; Complete Time: 18:16                                                                                                                                 

17:07 Order name: EKG - Nurse/Tech; Complete Time: 17:09                                                                                                                                   

17:07 Order name: IV Saline Lock; Complete Time: 17:36                                                                                                                                     

17:07 Order name: Labs collected and sent; Complete Time: 17:36                                                                                                                            

17:07 Order name: O2 Per Protocol; Complete Time: 18:16                                                                                                                                    

17:07 Order name: O2 Sat Monitoring; Complete Time: 18:16                                                                                                                                  

18:16 Order name: Labs - recollect needed: recollect lavender top; Complete Time: 18:25       bd  

                                                                                                  

Administered Medications:                                                                         

17:36 Drug: NS 0.9% IV 1000 ml Route: IV; Rate: 1 bolus; Site: right antecubital;             aa5 

18:50 Follow up: IV Status: Completed infusion; IV Intake: 1000ml                             bp  

                                                                                                  

                                                                                                  

Disposition Summary:                                                                              

23 18:21                                                                                    

Discharge Ordered                                                                                 

      Location: Home                                                                          jr11

      Condition: Stable                                                                       jr11

      Diagnosis                                                                                   

        - Palpitations                                                                        jr11

      Followup:                                                                               jr11

        - With: Johnny Aguilar MD                                                                 

        - When: 1 - 2 days                                                                         

        - Reason: Re-evaluation by your physician                                                  

      Discharge Instructions:                                                                     

        - Discharge Summary Sheet                                                             jr11

        - Palpitations                                                                        jr11

      Forms:                                                                                      

        - Medication Reconciliation Form                                                      jr11

        - Thank You Letter                                                                    jr11

        - Antibiotic Education                                                                jr11

        - Prescription Opioid Use                                                             jr11

Signatures:                                                                                       

Dispatcher [employer]                           EDMS                                                 

Shannan Liu Audri RN                     RN   aa5                                                  

Isaiah Quick MD MD   jr11                                                 

Emily, Guillaume                          RN   bp                                                   

                                                                                                  

**************************************************************************************************

## 2023-03-24 NOTE — EKG
Test Date:    2023-03-23               Test Time:    17:06:12

Technician:   DELMA                                    

                                                     

MEASUREMENT RESULTS:                                       

Intervals:                                           

Rate:         64                                     

MI:           136                                    

QRSD:         72                                     

QT:           390                                    

QTc:          402                                    

Axis:                                                

P:            72                                     

MI:           136                                    

QRS:          37                                     

T:            64                                     

                                                     

INTERPRETIVE STATEMENTS:                                       

                                                     

Normal sinus rhythm

Compared to ECG 02/26/2018 17:15:41

No significant changes



Electronically Signed On 03-24-23 13:38:37 CDT by Johnny Aguilar